# Patient Record
Sex: MALE | Race: WHITE | NOT HISPANIC OR LATINO | Employment: OTHER | URBAN - METROPOLITAN AREA
[De-identification: names, ages, dates, MRNs, and addresses within clinical notes are randomized per-mention and may not be internally consistent; named-entity substitution may affect disease eponyms.]

---

## 2017-04-24 ENCOUNTER — OFFICE VISIT - RIVER FALLS (OUTPATIENT)
Dept: FAMILY MEDICINE | Facility: CLINIC | Age: 52
End: 2017-04-24

## 2017-04-24 ASSESSMENT — MIFFLIN-ST. JEOR: SCORE: 1669.74

## 2017-04-28 ENCOUNTER — COMMUNICATION - RIVER FALLS (OUTPATIENT)
Dept: FAMILY MEDICINE | Facility: CLINIC | Age: 52
End: 2017-04-28

## 2017-04-28 ENCOUNTER — OFFICE VISIT - RIVER FALLS (OUTPATIENT)
Dept: FAMILY MEDICINE | Facility: CLINIC | Age: 52
End: 2017-04-28

## 2017-04-29 LAB
CHOLEST SERPL-MCNC: 216 MG/DL (ref 125–200)
CHOLEST/HDLC SERPL: 3.9 {RATIO}
GLUCOSE BLD-MCNC: 97 MG/DL (ref 65–99)
HDLC SERPL-MCNC: 56 MG/DL
LDLC SERPL CALC-MCNC: 145 MG/DL
NONHDLC SERPL-MCNC: 160 MG/DL
TRIGL SERPL-MCNC: 73 MG/DL

## 2017-09-14 ENCOUNTER — OFFICE VISIT - RIVER FALLS (OUTPATIENT)
Dept: FAMILY MEDICINE | Facility: CLINIC | Age: 52
End: 2017-09-14

## 2018-10-29 ENCOUNTER — OFFICE VISIT - RIVER FALLS (OUTPATIENT)
Dept: FAMILY MEDICINE | Facility: CLINIC | Age: 53
End: 2018-10-29

## 2018-10-29 ASSESSMENT — MIFFLIN-ST. JEOR: SCORE: 1672.24

## 2019-05-16 ENCOUNTER — OFFICE VISIT - RIVER FALLS (OUTPATIENT)
Dept: FAMILY MEDICINE | Facility: CLINIC | Age: 54
End: 2019-05-16

## 2019-05-16 ASSESSMENT — MIFFLIN-ST. JEOR: SCORE: 1663.16

## 2019-08-15 ENCOUNTER — AMBULATORY - RIVER FALLS (OUTPATIENT)
Dept: FAMILY MEDICINE | Facility: CLINIC | Age: 54
End: 2019-08-15

## 2019-08-16 ENCOUNTER — COMMUNICATION - RIVER FALLS (OUTPATIENT)
Dept: FAMILY MEDICINE | Facility: CLINIC | Age: 54
End: 2019-08-16

## 2019-08-16 LAB
A/G RATIO - HISTORICAL: 1.7 (ref 1–2.5)
ALBUMIN SERPL-MCNC: 4.4 GM/DL (ref 3.6–5.1)
ALP SERPL-CCNC: 92 UNIT/L (ref 40–115)
ALT SERPL W P-5'-P-CCNC: 19 UNIT/L (ref 9–46)
AST SERPL W P-5'-P-CCNC: 19 UNIT/L (ref 10–35)
BILIRUB SERPL-MCNC: 0.8 MG/DL (ref 0.2–1.2)
BUN SERPL-MCNC: 20 MG/DL (ref 7–25)
BUN/CREAT RATIO - HISTORICAL: 13 (ref 6–22)
CALCIUM SERPL-MCNC: 9.4 MG/DL (ref 8.6–10.3)
CHLORIDE BLD-SCNC: 105 MMOL/L (ref 98–110)
CHOLEST SERPL-MCNC: 216 MG/DL
CHOLEST/HDLC SERPL: 3.6 {RATIO}
CO2 SERPL-SCNC: 29 MMOL/L (ref 20–32)
CREAT SERPL-MCNC: 1.51 MG/DL (ref 0.7–1.33)
EGFRCR SERPLBLD CKD-EPI 2021: 52 ML/MIN/1.73M2
GLOBULIN: 2.6 (ref 1.9–3.7)
GLUCOSE BLD-MCNC: 97 MG/DL (ref 65–99)
HDLC SERPL-MCNC: 60 MG/DL
LDLC SERPL CALC-MCNC: 137 MG/DL
NONHDLC SERPL-MCNC: 156 MG/DL
POTASSIUM BLD-SCNC: 4.4 MMOL/L (ref 3.5–5.3)
PROT SERPL-MCNC: 7 GM/DL (ref 6.1–8.1)
SODIUM SERPL-SCNC: 141 MMOL/L (ref 135–146)
TRIGL SERPL-MCNC: 87 MG/DL

## 2019-09-16 ENCOUNTER — OFFICE VISIT - RIVER FALLS (OUTPATIENT)
Dept: FAMILY MEDICINE | Facility: CLINIC | Age: 54
End: 2019-09-16

## 2019-09-16 ASSESSMENT — MIFFLIN-ST. JEOR: SCORE: 1655.91

## 2019-09-20 ENCOUNTER — COMMUNICATION - RIVER FALLS (OUTPATIENT)
Dept: FAMILY MEDICINE | Facility: CLINIC | Age: 54
End: 2019-09-20

## 2019-12-12 ENCOUNTER — OFFICE VISIT - RIVER FALLS (OUTPATIENT)
Dept: FAMILY MEDICINE | Facility: CLINIC | Age: 54
End: 2019-12-12

## 2019-12-12 ASSESSMENT — MIFFLIN-ST. JEOR: SCORE: 1648.65

## 2019-12-19 ENCOUNTER — AMBULATORY - RIVER FALLS (OUTPATIENT)
Dept: FAMILY MEDICINE | Facility: CLINIC | Age: 54
End: 2019-12-19

## 2019-12-20 LAB
BUN SERPL-MCNC: 23 MG/DL (ref 7–25)
BUN/CREAT RATIO - HISTORICAL: 15 (ref 6–22)
CALCIUM SERPL-MCNC: 10 MG/DL (ref 8.6–10.3)
CHLORIDE BLD-SCNC: 104 MMOL/L (ref 98–110)
CHOLEST SERPL-MCNC: 220 MG/DL
CHOLEST/HDLC SERPL: 3.4 {RATIO}
CO2 SERPL-SCNC: 28 MMOL/L (ref 20–32)
CREAT SERPL-MCNC: 1.51 MG/DL (ref 0.7–1.33)
EGFRCR SERPLBLD CKD-EPI 2021: 52 ML/MIN/1.73M2
GLUCOSE BLD-MCNC: 101 MG/DL (ref 65–99)
HDLC SERPL-MCNC: 64 MG/DL
LDLC SERPL CALC-MCNC: 138 MG/DL
NONHDLC SERPL-MCNC: 156 MG/DL
POTASSIUM BLD-SCNC: 4.5 MMOL/L (ref 3.5–5.3)
SODIUM SERPL-SCNC: 141 MMOL/L (ref 135–146)
TRIGL SERPL-MCNC: 81 MG/DL

## 2019-12-22 ENCOUNTER — COMMUNICATION - RIVER FALLS (OUTPATIENT)
Dept: FAMILY MEDICINE | Facility: CLINIC | Age: 54
End: 2019-12-22

## 2019-12-26 ENCOUNTER — OFFICE VISIT - RIVER FALLS (OUTPATIENT)
Dept: FAMILY MEDICINE | Facility: CLINIC | Age: 54
End: 2019-12-26

## 2019-12-26 ASSESSMENT — MIFFLIN-ST. JEOR: SCORE: 1664.07

## 2020-01-14 ENCOUNTER — OFFICE VISIT - RIVER FALLS (OUTPATIENT)
Dept: FAMILY MEDICINE | Facility: CLINIC | Age: 55
End: 2020-01-14

## 2020-01-15 LAB
ALBUMIN SERPL-MCNC: 4.4 GM/DL (ref 3.6–5.1)
ALBUMIN UR-MCNC: NEGATIVE G/DL
APPEARANCE UR: CLEAR
BACTERIA #/AREA URNS HPF: NORMAL /HPF
BILIRUB UR QL STRIP: NEGATIVE
BUN SERPL-MCNC: 30 MG/DL (ref 7–25)
BUN/CREAT RATIO - HISTORICAL: 21 (ref 6–22)
CALCIUM SERPL-MCNC: 9.5 MG/DL (ref 8.6–10.3)
CAOX CRY #/AREA URNS HPF: NORMAL /HPF
CHLORIDE BLD-SCNC: 103 MMOL/L (ref 98–110)
CO2 SERPL-SCNC: 28 MMOL/L (ref 20–32)
COLOR UR AUTO: YELLOW
CREAT SERPL-MCNC: 1.44 MG/DL (ref 0.7–1.33)
CREAT UR-MCNC: 132 MG/DL (ref 20–320)
EGFRCR SERPLBLD CKD-EPI 2021: 55 ML/MIN/1.73M2
GLUCOSE BLD-MCNC: 84 MG/DL (ref 65–139)
GLUCOSE UR STRIP-MCNC: NEGATIVE MG/DL
HGB BLD-MCNC: 15.3 GM/DL (ref 13.2–17.1)
HGB UR QL STRIP: NEGATIVE
HYALINE CASTS #/AREA URNS LPF: NORMAL /LPF
KETONES UR STRIP-MCNC: NEGATIVE MG/DL
LEUKOCYTE ESTERASE UR QL STRIP: NEGATIVE
NITRATE UR QL: NEGATIVE
PH UR STRIP: 6 [PH] (ref 5–8)
POTASSIUM BLD-SCNC: 4.3 MMOL/L (ref 3.5–5.3)
PROT UR-MCNC: 15 MG/DL (ref 5–25)
PROT/CREAT 24H UR: 0.11 MG/G{CREAT} (ref 0.02–0.13)
PROT/CREAT 24H UR: 114 MG/G{CREAT} (ref 22–128)
PTH-INTACT SERPL-MCNC: 43 PG/ML (ref 14–64)
RBC #/AREA URNS AUTO: NORMAL /HPF
SODIUM SERPL-SCNC: 138 MMOL/L (ref 135–146)
SP GR UR STRIP: 1.02 (ref 1–1.03)
SQUAMOUS #/AREA URNS AUTO: NORMAL /HPF
WBC #/AREA URNS AUTO: NORMAL /HPF

## 2020-01-16 ENCOUNTER — COMMUNICATION - RIVER FALLS (OUTPATIENT)
Dept: FAMILY MEDICINE | Facility: CLINIC | Age: 55
End: 2020-01-16

## 2020-03-10 ENCOUNTER — OFFICE VISIT - RIVER FALLS (OUTPATIENT)
Dept: FAMILY MEDICINE | Facility: CLINIC | Age: 55
End: 2020-03-10

## 2020-03-10 ASSESSMENT — MIFFLIN-ST. JEOR: SCORE: 1677.68

## 2020-06-09 ENCOUNTER — OFFICE VISIT - RIVER FALLS (OUTPATIENT)
Dept: FAMILY MEDICINE | Facility: CLINIC | Age: 55
End: 2020-06-09

## 2020-06-09 ASSESSMENT — MIFFLIN-ST. JEOR: SCORE: 1681.31

## 2021-02-03 ENCOUNTER — OFFICE VISIT - RIVER FALLS (OUTPATIENT)
Dept: FAMILY MEDICINE | Facility: CLINIC | Age: 56
End: 2021-02-03

## 2021-02-03 ASSESSMENT — MIFFLIN-ST. JEOR: SCORE: 1691.29

## 2021-04-06 ENCOUNTER — AMBULATORY - RIVER FALLS (OUTPATIENT)
Dept: FAMILY MEDICINE | Facility: CLINIC | Age: 56
End: 2021-04-06

## 2021-04-07 ENCOUNTER — COMMUNICATION - RIVER FALLS (OUTPATIENT)
Dept: FAMILY MEDICINE | Facility: CLINIC | Age: 56
End: 2021-04-07

## 2021-04-07 LAB
ALBUMIN UR-MCNC: NEGATIVE G/DL
APPEARANCE UR: CLEAR
BACTERIA #/AREA URNS HPF: NORMAL /HPF
BILIRUB UR QL STRIP: NEGATIVE
BUN SERPL-MCNC: 17 MG/DL (ref 7–25)
BUN/CREAT RATIO - HISTORICAL: 12 (ref 6–22)
CALCIUM SERPL-MCNC: 9.8 MG/DL (ref 8.6–10.3)
CHLORIDE BLD-SCNC: 104 MMOL/L (ref 98–110)
CO2 SERPL-SCNC: 26 MMOL/L (ref 20–32)
COLOR UR AUTO: YELLOW
CREAT SERPL-MCNC: 1.39 MG/DL (ref 0.7–1.33)
CREAT UR-MCNC: 17 MG/DL (ref 20–320)
EGFRCR SERPLBLD CKD-EPI 2021: 57 ML/MIN/1.73M2
GLUCOSE BLD-MCNC: 79 MG/DL (ref 65–99)
GLUCOSE UR STRIP-MCNC: NEGATIVE MG/DL
HGB BLD-MCNC: 15.8 GM/DL (ref 13.2–17.1)
HGB UR QL STRIP: NEGATIVE
HYALINE CASTS #/AREA URNS LPF: NORMAL /LPF
KETONES UR STRIP-MCNC: NEGATIVE MG/DL
LEUKOCYTE ESTERASE UR QL STRIP: NEGATIVE
MICROALBUMIN UR-MCNC: <0.2 MG/DL
MICROALBUMIN/CREAT UR: ABNORMAL MG/G{CREAT}
NITRATE UR QL: NEGATIVE
PH UR STRIP: 6.5 [PH] (ref 5–8)
PHOSPHATE SERPL-MCNC: 2.9 MG/DL (ref 2.5–4.5)
POTASSIUM BLD-SCNC: 3.8 MMOL/L (ref 3.5–5.3)
PTH-INTACT SERPL-MCNC: 43 PG/ML (ref 14–64)
RBC #/AREA URNS AUTO: NORMAL /HPF
SODIUM SERPL-SCNC: 138 MMOL/L (ref 135–146)
SP GR UR STRIP: 1 (ref 1–1.03)
SQUAMOUS #/AREA URNS AUTO: NORMAL /HPF
WBC #/AREA URNS AUTO: NORMAL /HPF

## 2021-04-14 ENCOUNTER — OFFICE VISIT - RIVER FALLS (OUTPATIENT)
Dept: FAMILY MEDICINE | Facility: CLINIC | Age: 56
End: 2021-04-14

## 2021-04-14 ASSESSMENT — MIFFLIN-ST. JEOR: SCORE: 1700.36

## 2021-12-30 ENCOUNTER — AMBULATORY - RIVER FALLS (OUTPATIENT)
Dept: FAMILY MEDICINE | Facility: CLINIC | Age: 56
End: 2021-12-30

## 2021-12-31 ENCOUNTER — COMMUNICATION - RIVER FALLS (OUTPATIENT)
Dept: FAMILY MEDICINE | Facility: CLINIC | Age: 56
End: 2021-12-31

## 2021-12-31 LAB
BUN SERPL-MCNC: 20 MG/DL (ref 7–25)
BUN/CREAT RATIO - HISTORICAL: 15 (ref 6–22)
CALCIUM SERPL-MCNC: 9.5 MG/DL (ref 8.6–10.3)
CHLORIDE BLD-SCNC: 104 MMOL/L (ref 98–110)
CHOLEST SERPL-MCNC: 220 MG/DL
CHOLEST/HDLC SERPL: 3.9 {RATIO}
CO2 SERPL-SCNC: 29 MMOL/L (ref 20–32)
CREAT SERPL-MCNC: 1.34 MG/DL (ref 0.7–1.33)
EGFRCR SERPLBLD CKD-EPI 2021: 59 ML/MIN/1.73M2
GLUCOSE BLD-MCNC: 91 MG/DL (ref 65–99)
HDLC SERPL-MCNC: 56 MG/DL
LDLC SERPL CALC-MCNC: 149 MG/DL
NONHDLC SERPL-MCNC: 164 MG/DL
POTASSIUM BLD-SCNC: 3.9 MMOL/L (ref 3.5–5.3)
SODIUM SERPL-SCNC: 139 MMOL/L (ref 135–146)
TRIGL SERPL-MCNC: 60 MG/DL

## 2022-01-20 ENCOUNTER — MEDICAL CORRESPONDENCE (OUTPATIENT)
Dept: HEALTH INFORMATION MANAGEMENT | Facility: CLINIC | Age: 57
End: 2022-01-20

## 2022-01-20 ENCOUNTER — OFFICE VISIT - RIVER FALLS (OUTPATIENT)
Dept: FAMILY MEDICINE | Facility: CLINIC | Age: 57
End: 2022-01-20

## 2022-01-21 ENCOUNTER — COMMUNICATION - RIVER FALLS (OUTPATIENT)
Dept: FAMILY MEDICINE | Facility: CLINIC | Age: 57
End: 2022-01-21

## 2022-01-21 LAB — PSA SERPL-MCNC: 1.3 NG/ML

## 2022-02-12 VITALS
HEART RATE: 75 BPM | WEIGHT: 182.6 LBS | HEART RATE: 67 BPM | BODY MASS INDEX: 27.47 KG/M2 | SYSTOLIC BLOOD PRESSURE: 108 MMHG | BODY MASS INDEX: 27.55 KG/M2 | TEMPERATURE: 97.9 F | DIASTOLIC BLOOD PRESSURE: 70 MMHG | HEIGHT: 69 IN | BODY MASS INDEX: 27.05 KG/M2 | DIASTOLIC BLOOD PRESSURE: 68 MMHG | SYSTOLIC BLOOD PRESSURE: 110 MMHG | WEIGHT: 186 LBS | SYSTOLIC BLOOD PRESSURE: 100 MMHG | OXYGEN SATURATION: 97 % | HEIGHT: 69 IN | HEIGHT: 69 IN | HEART RATE: 72 BPM | DIASTOLIC BLOOD PRESSURE: 64 MMHG | OXYGEN SATURATION: 99 %

## 2022-02-12 VITALS
WEIGHT: 192 LBS | HEIGHT: 69 IN | HEART RATE: 85 BPM | SYSTOLIC BLOOD PRESSURE: 104 MMHG | OXYGEN SATURATION: 96 % | BODY MASS INDEX: 28.44 KG/M2 | TEMPERATURE: 97.9 F | DIASTOLIC BLOOD PRESSURE: 70 MMHG

## 2022-02-12 VITALS
HEART RATE: 77 BPM | BODY MASS INDEX: 27.52 KG/M2 | HEIGHT: 69 IN | TEMPERATURE: 98 F | SYSTOLIC BLOOD PRESSURE: 102 MMHG | OXYGEN SATURATION: 94 % | WEIGHT: 185.8 LBS | DIASTOLIC BLOOD PRESSURE: 68 MMHG

## 2022-02-12 VITALS
SYSTOLIC BLOOD PRESSURE: 102 MMHG | BODY MASS INDEX: 27.28 KG/M2 | TEMPERATURE: 98.3 F | HEART RATE: 72 BPM | WEIGHT: 184.2 LBS | HEIGHT: 69 IN | DIASTOLIC BLOOD PRESSURE: 80 MMHG

## 2022-02-12 VITALS
SYSTOLIC BLOOD PRESSURE: 120 MMHG | WEIGHT: 194 LBS | HEART RATE: 77 BPM | OXYGEN SATURATION: 99 % | BODY MASS INDEX: 28.73 KG/M2 | HEIGHT: 69 IN | TEMPERATURE: 96.4 F | DIASTOLIC BLOOD PRESSURE: 83 MMHG

## 2022-02-12 VITALS
OXYGEN SATURATION: 96 % | BODY MASS INDEX: 27.67 KG/M2 | DIASTOLIC BLOOD PRESSURE: 66 MMHG | WEIGHT: 187.4 LBS | SYSTOLIC BLOOD PRESSURE: 108 MMHG | HEART RATE: 75 BPM

## 2022-02-12 VITALS
BODY MASS INDEX: 27.99 KG/M2 | WEIGHT: 189 LBS | HEIGHT: 69 IN | DIASTOLIC BLOOD PRESSURE: 70 MMHG | TEMPERATURE: 97 F | SYSTOLIC BLOOD PRESSURE: 116 MMHG | HEART RATE: 64 BPM

## 2022-02-12 VITALS
HEART RATE: 70 BPM | SYSTOLIC BLOOD PRESSURE: 100 MMHG | BODY MASS INDEX: 27.74 KG/M2 | DIASTOLIC BLOOD PRESSURE: 68 MMHG | WEIGHT: 187.25 LBS | TEMPERATURE: 97.4 F | HEIGHT: 69 IN

## 2022-02-12 VITALS
HEIGHT: 69 IN | WEIGHT: 187.8 LBS | BODY MASS INDEX: 27.81 KG/M2 | DIASTOLIC BLOOD PRESSURE: 60 MMHG | TEMPERATURE: 97.9 F | SYSTOLIC BLOOD PRESSURE: 100 MMHG | HEART RATE: 66 BPM

## 2022-02-12 VITALS
TEMPERATURE: 98 F | BODY MASS INDEX: 28.11 KG/M2 | HEIGHT: 69 IN | HEART RATE: 73 BPM | WEIGHT: 189.8 LBS | OXYGEN SATURATION: 96 % | SYSTOLIC BLOOD PRESSURE: 112 MMHG | DIASTOLIC BLOOD PRESSURE: 76 MMHG

## 2022-02-15 NOTE — PROGRESS NOTES
Patient:   SALUD TRUJILLO            MRN: [id number]            FIN: [id number]               Age:   51 years     Sex:  Male     :  1965   Associated Diagnoses:   Moderate major depression   Author:   Juli Galvan MD      Chief Complaint   2017 4:23 PM CDT    Med check-Depression. Tolerating med well.        Interval History   Patient here for follow up on depression. Medication is working well. No side effects noted. Would like to continue medication.       Health Status   Allergies:    Allergic Reactions (All)  Severity Not Documented  Aspirin Adult Low Strength (Stomach upset)  Celexa (Agitated)  Melatonin (Anxiety,nausea,hot flashes)   Medications:  (Selected)   Prescriptions  Prescribed  Paxil 30 mg oral tablet: 1 tab(s) ( 30 mg ), PO, Daily, # 90 tab(s), 3 Refill(s), Type: Maintenance, Pharmacy: [facility] #[id number], 1 tab(s) po daily  Viagra 100 mg oral tablet: 1 tab(s) ( 100 mg ), PO, daily, # 9 tab(s), 11 Refill(s), Type: Soft Stop, Pharmacy: [facility] #[id number], 1 tab(s) po daily      Histories   Past Medical History:    Resolved  Moderate recurrent major depression (ICD-9-.32):  Resolved.  Vestibular schwannoma (SNOMED CT 397053962):  Resolved.   Family History:    CA - Cancer  Father ()     Procedure history:    Colonoscopy (439000756) on 2016 at 50 Years.  Comments:  2016 8:18 AM - Delon Patel MD  Indication: Screening  Sedation: Versed 3 mg, Fentanyl 150 mcg  Findings: Mild Sigmoid diverticulosis  Recommendation: Repeat in 10 years  Esophagogastroduodenoscopy (1041135493) on 2011 at 45 Years.  Removal of vestibular schwannoma (194265654) in  at 43 Years.  Tonsillectomy and adenoidectomy ([id number]).   Social History:        Alcohol Assessment            Beer (12 oz), 3 x's per week., 2 drinks/episode average.  3 drinks/episode maximum.      Tobacco Assessment: Denies Tobacco Use            Never smoker      Substance Abuse Assessment             Never      Employment and Education Assessment            Employed, Work/School description: .      Home and Environment Assessment            Marital status:  (Living together).  Spouse/Partner name: Kortney.  Lives with Spouse.  Living situation:               Home/Independent.      Nutrition and Health Assessment            Type of diet: Regular.      Exercise and Physical Activity Assessment: Does not exercise        Physical Examination   Vital Signs   4/24/2017 4:23 PM CDT Temperature Tympanic 97.4 DegF  LOW    Peripheral Pulse Rate 70 bpm    Pulse Site Radial artery    HR Method Manual    Systolic Blood Pressure 100 mmHg    Diastolic Blood Pressure 68 mmHg    Mean Arterial Pressure 79 mmHg    BP Site Right arm    BP Method Manual      Measurements from flowsheet : Measurements   4/24/2017 4:23 PM CDT Height Measured - Standard 69 in    Weight Measured - Standard 187.25 lb    BSA 2.03 m2    Body Mass Index 27.65 kg/m2      General:  Alert and oriented, No acute distress.    Psychiatric:  Cooperative, Appropriate mood & affect, Normal judgment, phq9 reviewed.       Impression and Plan   Diagnosis     Moderate major depression (WHL53-QA F32.1).     Course:  Well controlled.    Orders     Orders (Selected)   Prescriptions  Prescribed  Paxil 30 mg oral tablet: 1 tab(s) ( 30 mg ), PO, Daily, # 90 tab(s), 3 Refill(s), Type: Maintenance, Pharmacy: [facility] #[id number], 1 tab(s) po daily.

## 2022-02-15 NOTE — LETTER
(Inserted Image. Unable to display)         March 11, 2021        SALUD TRUJILLO   [address] [place] [id number]        Dear SALUD,    Thank you for selecting [facility] for your healthcare needs.    Our records indicate you are due for the following services:     Kidney Disease Follow Up   Non-Fasting Labs  Urine labs ~ Please be prepared to leave a urine specimen for evaluation     If you had your labs done at another facility or with Direct Access Lab Testing at [facility]   [place], please bring in a copy of the results to your next visit, mail a copy, or drop off a copy of your results to your Healthcare Provider.     (FYI   Regarding office visits: In some instances, a video visit or telephone visit may be offered as an option.)    To schedule an appointment or if you have further questions, please contact your clinic at [phone].    Powered by [employer]    Sincerely,    Malcolm Herrera MD

## 2022-02-15 NOTE — NURSING NOTE
Comprehensive Intake Entered On:  6/9/2020 1:41 PM CDT    Performed On:  6/9/2020 1:38 PM CDT by Evonne CHAVEZ, Brittanie               Summary   Chief Complaint :   f/u skin exam, has spot on back.   Menstrual Status :   N/A   Weight Measured :   189.8 lb(Converted to: 189 lb 13 oz, 86.09 kg)    Height Measured :   69 in(Converted to: 5 ft 9 in, 175.26 cm)    Body Mass Index :   28.03 kg/m2 (HI)    Body Surface Area :   2.05 m2   Systolic Blood Pressure :   112 mmHg   Diastolic Blood Pressure :   76 mmHg   Mean Arterial Pressure :   88 mmHg   Peripheral Pulse Rate :   73 bpm   BP Site :   Right arm   Pulse Site :   Radial artery   BP Method :   Manual   HR Method :   Manual   Temperature Tympanic :   98 DegF(Converted to: 36.7 DegC)    Oxygen Saturation :   96 %   Brittanie Douglass MA - 6/9/2020 1:38 PM CDT   Health Status   Allergies Verified? :   Yes   Medication History Verified? :   Yes   Immunizations Current :   Unknown   Medical History Verified? :   Yes   Pre-Visit Planning Status :   Completed   Tobacco Use? :   Former smoker   Brittanie Douglass MA - 6/9/2020 1:38 PM CDT   Consents   Consent for Immunization Exchange :   Consent Granted   Consent for Immunizations to Providers :   Consent Granted   Brittanie Douglass MA - 6/9/2020 1:38 PM CDT   Meds / Allergies   (As Of: 6/9/2020 1:41:51 PM CDT)   Allergies (Active)   Aspirin Adult Low Strength  Estimated Onset Date:   Unspecified ; Reactions:   Stomach upset ; Created By:   Claudia Barrera LPN; Reaction Status:   Active ; Category:   Drug ; Substance:   Aspirin Adult Low Strength ; Type:   Allergy ; Updated By:   Claudia Barrera LPN; Reviewed Date:   1/14/2020 2:19 PM CST      Celexa  Estimated Onset Date:   Unspecified ; Reactions:   Agitated ; Created By:   America Montelongo; Reaction Status:   Active ; Category:   Drug ; Substance:   Celexa ; Type:   Allergy ; Updated By:   America Montelongo; Source:   Paper Chart ; Reviewed Date:   1/14/2020 2:19 PM CST       melatonin  Estimated Onset Date:   Unspecified ; Reactions:   Anxiety,nausea,hot flashes ; Created By:   Claudia Barrera LPN; Reaction Status:   Active ; Category:   Drug ; Substance:   melatonin ; Type:   Allergy ; Updated By:   Claudia Barrera LPN; Reviewed Date:   1/14/2020 2:19 PM CST        Medication List   (As Of: 6/9/2020 1:41:51 PM CDT)   Prescription/Discharge Order    PARoxetine  :   PARoxetine ; Status:   Prescribed ; Ordered As Mnemonic:   Paxil 30 mg oral tablet ; Simple Display Line:   30 mg, 1 tab(s), PO, Daily, 90 tab(s), 3 Refill(s) ; Ordering Provider:   Dat Huffman PA-C; Catalog Code:   PARoxetine ; Order Dt/Tm:   12/26/2019 8:09:33 AM CST          sildenafil  :   sildenafil ; Status:   Prescribed ; Ordered As Mnemonic:   Viagra 100 mg oral tablet ; Simple Display Line:   100 mg, 1 tab(s), PO, daily, 9 tab(s), 11 Refill(s) ; Ordering Provider:   Juli Galvan MD; Catalog Code:   sildenafil ; Order Dt/Tm:   4/7/2016 9:12:20 AM CDT            ID Risk Screen   Recent Travel History :   No recent travel   Family Member Travel History :   No recent travel   Other Exposure to Infectious Disease :   Unknown   Evonne CHAVEZ, Brittanie - 6/9/2020 1:38 PM CDT

## 2022-02-15 NOTE — NURSING NOTE
Comprehensive Intake Entered On:  12/12/2019 5:40 PM CST    Performed On:  12/12/2019 5:36 PM CST by Evonne CHAVEZ, Brittanie               Summary   Chief Complaint :   f/u skin exam--BCC to left ear and mid back.  noticed new lesions on right nostril, left cheek and forehead.   Menstrual Status :   N/A   Weight Measured :   182.6 lb(Converted to: 182 lb 10 oz, 82.83 kg)    Height Measured :   69 in(Converted to: 5 ft 9 in, 175.26 cm)    Body Mass Index :   26.96 kg/m2 (HI)    Body Surface Area :   2.01 m2   Systolic Blood Pressure :   100 mmHg   Diastolic Blood Pressure :   68 mmHg   Mean Arterial Pressure :   79 mmHg   Peripheral Pulse Rate :   72 bpm   BP Site :   Right arm   Pulse Site :   Radial artery   BP Method :   Manual   HR Method :   Manual   Temperature Tympanic :   97.9 DegF(Converted to: 36.6 DegC)    Brittanie Douglass MA - 12/12/2019 5:36 PM CST   Health Status   Allergies Verified? :   Yes   Medication History Verified? :   Yes   Immunizations Current :   Unknown   Medical History Verified? :   Yes   Pre-Visit Planning Status :   Completed   Tobacco Use? :   Never smoker   Brittanie Douglass MA - 12/12/2019 5:36 PM CST   Consents   Consent for Immunization Exchange :   Consent Granted   Consent for Immunizations to Providers :   Consent Granted   Brittanie Douglass MA - 12/12/2019 5:36 PM CST   Meds / Allergies   (As Of: 12/12/2019 5:40:15 PM CST)   Allergies (Active)   Aspirin Adult Low Strength  Estimated Onset Date:   Unspecified ; Reactions:   Stomach upset ; Created By:   Claudia Barrera LPN; Reaction Status:   Active ; Category:   Drug ; Substance:   Aspirin Adult Low Strength ; Type:   Allergy ; Updated By:   Claudia Barrera LPN; Reviewed Date:   5/16/2019 9:24 AM CDT      Celexa  Estimated Onset Date:   Unspecified ; Reactions:   Agitated ; Created By:   America Montelongo; Reaction Status:   Active ; Category:   Drug ; Substance:   Celexa ; Type:   Allergy ; Updated By:   America Montelongo; Source:   Paper  Chart ; Reviewed Date:   5/16/2019 9:24 AM CDT      melatonin  Estimated Onset Date:   Unspecified ; Reactions:   Anxiety,nausea,hot flashes ; Created By:   Claudia Barrera LPN; Reaction Status:   Active ; Category:   Drug ; Substance:   melatonin ; Type:   Allergy ; Updated By:   Claudia Barrera LPN; Reviewed Date:   5/16/2019 9:24 AM CDT        Medication List   (As Of: 12/12/2019 5:40:15 PM CST)   Prescription/Discharge Order    PARoxetine  :   PARoxetine ; Status:   Prescribed ; Ordered As Mnemonic:   Paxil 30 mg oral tablet ; Simple Display Line:   30 mg, 1 tab(s), PO, Daily, 90 tab(s), 0 Refill(s) ; Ordering Provider:   Juli Galvan MD; Catalog Code:   PARoxetine ; Order Dt/Tm:   8/13/2019 10:47:52 AM CDT          sildenafil  :   sildenafil ; Status:   Prescribed ; Ordered As Mnemonic:   Viagra 100 mg oral tablet ; Simple Display Line:   100 mg, 1 tab(s), PO, daily, 9 tab(s), 11 Refill(s) ; Ordering Provider:   Juli Galvan MD; Catalog Code:   sildenafil ; Order Dt/Tm:   4/7/2016 9:12:20 AM CDT            Social History   Social History   (As Of: 12/12/2019 5:40:15 PM CST)   Alcohol:        Beer (12 oz), 3 x's per week., 2 drinks/episode average.  3 drinks/episode maximum.   (Last Updated: 4/7/2016 11:39:19 AM CDT by Claudia Barrera LPN)          Tobacco:  Denies Tobacco Use      Never smoker   (Last Updated: 4/24/2017 4:27:05 PM CDT by Claudia Barrera LPN)          Substance Abuse:        Never   (Last Updated: 4/7/2016 11:40:50 AM CDT by Claudia Barrera LPN)          Employment/School:        Employed, Work/School description: .   (Last Updated: 4/7/2016 11:39:50 AM CDT by Claudia Barrera LPN)          Home/Environment:        Marital status:  (Living together).  Spouse/Partner name: Kortney.  Lives with Spouse.  Living situation: Home/Independent.   (Last Updated: 4/7/2016 11:40:07 AM CDT by Claudia Barrera LPN)          Nutrition/Health:        Type of diet: Regular.   (Last Updated:  4/7/2016 11:40:23 AM CDT by Claudia Barrera LPN)          Exercise:  Does not exercise      (Last Updated: 4/7/2016 11:40:34 AM CDT by Claudia Barrera LPN )

## 2022-02-15 NOTE — LETTER
(Inserted Image. Unable to display)   September 07, 2021  SALUD TRUJILLO   [address] [place] [id number]          Dear SALUD,      Thank you for selecting [facility] for your healthcare needs.    Our records indicate you are due for the following services:    Annual Physical & Fasting Lab Tests ~ Please do not eat or drink anything 10 hours prior to your scheduled appointment time.  (Water and any medications that you may need are allowed unless directed otherwise.)    If you had your labs done at another facility or with Direct Access Lab Testing at [facility], please bring in a copy of the results to your next visit, mail a copy, or drop off a copy of your results to your Healthcare Provider.    You are due for lab work and an office visit; please schedule the lab appointment 1 week before the office visit.  This will assure all results are available to discuss with your Healthcare Provider during your visit.    (FYI   Regarding office visits: In some instances, a video visit or telephone visit may be offered as an option.)      **It is very helpful if you bring your medication bottles to your appointment.  This assures we have all of your current medications, including strength and dosing information, documented accurately in your medical record.    To schedule an appointment or if you have further questions, please contact your clinic at [phone].         Powered by [employer]    Sincerely,    Juli Galvan M.D.

## 2022-02-15 NOTE — LETTER
(Inserted Image. Unable to display)   [address] [place] [address]  April 07, 2021      SALUD TRUJILLO   [address] [place] [address]        Dear SALUD,     Thank you for selecting [facility] for your healthcare needs. Below you will find the results of your recent test(s) done at our clinic.       Labs for your review.  We can discuss in more detail at future clinic visit.       Result Name Current Result Previous Result Reference Range   Sodium Level (mmol/L)  138 4/6/2021  138 1/14/2020 135 - 146   Potassium Level (mmol/L)  3.8 4/6/2021  4.3 1/14/2020 3.5 - 5.3   Chloride Level (mmol/L)  104 4/6/2021  103 1/14/2020 98 - 110   CO2 Level (mmol/L)  26 4/6/2021  28 1/14/2020 20 - 32   Glucose Level (mg/dL)  79 4/6/2021  84 1/14/2020 65 - 99   BUN (mg/dL)  17 4/6/2021 ((H)) 30 1/14/2020 7 - 25   Creatinine Level (mg/dL) ((H)) 1.39 4/6/2021 ((H)) 1.44 1/14/2020 0.70 - 1.33   BUN/Creat Ratio  12 4/6/2021  21 1/14/2020 6 - 22   eGFR (mL/min/1.73m2) ((L)) 57 4/6/2021 ((L)) 55 1/14/2020 > OR = 60 -    eGFR  (mL/min/1.73m2)  66 4/6/2021  63 1/14/2020 > OR = 60 -    Calcium Level (mg/dL)  9.8 4/6/2021  9.5 1/14/2020 8.6 - 10.3   Phosphorus Level (mg/dL)  2.9 4/6/2021  2.5 - 4.5   PTH Intact (pg/mL)  43 4/6/2021  43 1/14/2020 14 - 64   U Creatinine (mg/dL) ((L)) 17 4/6/2021  20 - 320   U Microalbumin (mg/dL)  <0.2 4/6/2021  See Note: -    Ur Microalbumin/Creatinine Ratio  NOTE 4/6/2021   - <30   Hgb (gm/dL)  15.8 4/6/2021  15.3 1/14/2020 13.2 - 17.1   UA Color  YELLOW 4/6/2021  YELLOW 1/14/2020 YELLOW -    UA Appear  CLEAR 4/6/2021  CLEAR 1/14/2020 CLEAR -    UA pH  6.5 4/6/2021  6.0 1/14/2020 5.0 - 8.0   UA Specific Robbins  1.003 4/6/2021  1.022 1/14/2020 1.001 - 1.035   UA Glucose  NEGATIVE 4/6/2021  NEGATIVE 1/14/2020 NEGATIVE - NEGATIVE   UA Bilirubin  NEGATIVE 4/6/2021  NEGATIVE 1/14/2020 NEGATIVE - NEGATIVE   UA Ketones   NEGATIVE 4/6/2021  NEGATIVE 1/14/2020 NEGATIVE - NEGATIVE   UA Blood  NEGATIVE 4/6/2021  NEGATIVE 1/14/2020 NEGATIVE - NEGATIVE   UA Protein  NEGATIVE 4/6/2021  NEGATIVE 1/14/2020 NEGATIVE - NEGATIVE   UA Nitrite  NEGATIVE 4/6/2021  NEGATIVE 1/14/2020 NEGATIVE - NEGATIVE   UA Leukocyte Esterase  NEGATIVE 4/6/2021  NEGATIVE 1/14/2020 NEGATIVE - NEGATIVE   UA Squamous Epithelial Cells (/HPF)  NONE SEEN 4/6/2021  NONE SEEN 1/14/2020  - < OR = 5   UA Hyaline Cast (/LPF)  NONE SEEN 4/6/2021  NONE SEEN 1/14/2020 NONE SEEN -    UA WBC (/HPF)  NONE SEEN 4/6/2021  NONE SEEN 1/14/2020  - < OR = 5   UA RBC (/HPF)  NONE SEEN 4/6/2021  NONE SEEN 1/14/2020  - < OR = 2   UA Bacteria (/HPF)  NONE SEEN 4/6/2021  NONE SEEN 1/14/2020 NONE SEEN -        Please contact me or my assistant at [phone] if you have any questions or concerns.     Sincerely,        Malcolm Herrera MD    What do your labs mean?  Below is a glossary of commonly ordered labs:  LDL - Bad Cholesterol  HDL - Good Cholesterol  AST/ALT - Liver Function  Cr/Creatinine - Kidney Function  Microalbumin - Kidney Function  BUN - Kidney Function  PSA - Prostate   TSH - Thyroid Hormone  HgbA1c - Diabetes Test  Hgb (Hemoglobin) - Red Blood Cells

## 2022-02-15 NOTE — LETTER
(Inserted Image. Unable to display)   [address] [place] [address]  August 16, 2019      SALUD TRUJILLO       [address], [place] [id number]      Dear SALUD,    Thank you for selecting [facility] for your healthcare needs. Below you will find the results of the recent tests done at our clinic.     Your lab results are listed below. Your kidney function (creatinine) is above normal. Cholesterol levels are mildly elevated. Please follow up in clinic to discuss further.     Result Name Current Result Reference Range   Sodium Level (mmol/L)  141 8/15/2019 135 - 146   Potassium Level (mmol/L)  4.4 8/15/2019 3.5 - 5.3   Chloride Level (mmol/L)  105 8/15/2019 98 - 110   CO2 Level (mmol/L)  29 8/15/2019 20 - 32   Glucose Level (mg/dL)  97 8/15/2019 65 - 99   BUN (mg/dL)  20 8/15/2019 7 - 25   Creatinine Level (mg/dL) ((H)) 1.51 8/15/2019 0.70 - 1.33   BUN/Creat Ratio  13 8/15/2019 6 - 22   Calcium Level (mg/dL)  9.4 8/15/2019 8.6 - 10.3   Bilirubin Total (mg/dL)  0.8 8/15/2019 0.2 - 1.2   Alkaline Phosphatase (unit/L)  92 8/15/2019 40 - 115   AST/SGOT (unit/L)  19 8/15/2019 10 - 35   ALT/SGPT (unit/L)  19 8/15/2019 9 - 46   Protein Total (gm/dL)  7.0 8/15/2019 6.1 - 8.1   Albumin Level (gm/dL)  4.4 8/15/2019 3.6 - 5.1   Globulin  2.6 8/15/2019 1.9 - 3.7   A/G Ratio  1.7 8/15/2019 1.0 - 2.5   Cholesterol (mg/dL) ((H)) 216 8/15/2019  - <200   Non-HDL Cholesterol ((H)) 156 8/15/2019  - <130   HDL (mg/dL)  60 8/15/2019 >40 -    Cholesterol/HDL Ratio  3.6 8/15/2019  - <5.0   LDL ((H)) 137 8/15/2019    Triglyceride (mg/dL)  87 8/15/2019  - <150       Please contact me or my assistant at [phone] if you have any questions or concerns.     Sincerely,        Juli Galvan M.D.

## 2022-02-15 NOTE — LETTER
(Inserted Image. Unable to display)   June 08, 2020      SALUD TRUJILLO   [address] [place] [phone]        Dear SALUD,      Thank you for selecting [facility] for your healthcare needs.      Recent CT of chest demonstrating stable lung nodule without any significant changes compared to last year's imaging.  No further imaging follow-up recommended for this nodule, but if tobacco use within the past 15 years, it would worth talking about potential benefits of annual CT imaging for lung cancer screening.            Please contact me or my assistant at [phone] if you have any questions or concerns.     Sincerely,        Malcolm Herrera MD

## 2022-02-15 NOTE — PROGRESS NOTES
Chief Complaint    here for skin check.  would like left ear looked at--has recurring scabbed lesion, has tried cryocautery.  History of Present Illness      Patient is here for check on skin lesion of the left pinna.  He has had this treated with cryocautery in the past.  It has recurred and continues to be irritated and crusty.  He has a long history of sun exposure as a young man.  He tries risk on sunscreen and wear a hat.  No family history of skin cancer.  Review of Systems      See HPI.  All other review of systems negative.  Physical Exam   Vitals & Measurements    T: 98.3   F (Tympanic)  HR: 72(Peripheral)  BP: 102/80     HT: 69 in  WT: 184.2 lb  BMI: 27.2       Alert, oriented, no acute distress       Normal heart rate       Nonlabored breathing        3 mm elevated crusty lesion on the left pinna        4 mm erythematous lesion with reflex structures on the mid back.  Assessment/Plan       1. Neoplasm of skin of ear (D48.5)         Suspect basal cell carcinoma versus pigmented actinic keratoses        Informed consent was obtained and each lesion was cleansed with alcohol anesthetized 1% lidocaine with epinephrine.  Shave biopsy was done of the ear lesion and a shave excision was done using a saucerization technique for the lesion on the back.  Patient Information     Name:SAULD TRUJILLO      Address:      [address] [address]     Sex:Male     YOB: 1965     [phone]     Emergency Contact:MARILEE TRUJILLO     MRN:[id number]     FIN:[id number]     Location:[facility]     Date of Service:09/16/2019      Primary Care Physician:       Juli Galvan MD, [phone]      Attending Physician:       Smith Stovall MD, [phone]  Problem List/Past Medical History    Ongoing     Major depression in full remission    Historical     Moderate recurrent major depression     Vestibular schwannoma  Procedure/Surgical History     Lithotripsy  using laser (05/17/2019)     Colonoscopy (06/01/2016)      Comments: Indication: Screening      Sedation: Versed 3 mg, Fentanyl 150 mcg      Findings: Mild Sigmoid diverticulosis      Recommendation: Repeat in 10 years.     Esophagogastroduodenoscopy (01/28/2011)     Removal of vestibular schwannoma (2008)     Tonsillectomy and adenoidectomy  Medications    Paxil 30 mg oral tablet, 30 mg= 1 tab(s), Oral, daily    Viagra 100 mg oral tablet, 100 mg= 1 tab(s), Oral, daily, 11 refills  Allergies    Aspirin Adult Low Strength (Stomach upset)    Celexa (Agitated)    melatonin (Anxiety,nausea,hot flashes)  Social History    Smoking Status - 09/16/2019     Never smoker     Alcohol      Beer (12 oz), 3 x's per week., 2 drinks/episode average. 3 drinks/episode maximum., 04/07/2016     Employment/School      Employed, Work/School description: ., 04/07/2016     Exercise - Does not exercise, 04/07/2016     Home/Environment      Marital status:  (Living together). Spouse/Partner name: Kortney. Lives with Spouse. Living situation: Home/Independent., 04/07/2016     Nutrition/Health      Type of diet: Regular., 04/07/2016     Substance Abuse      Never, 04/07/2016     Tobacco - Denies Tobacco Use, 04/19/2010      Never smoker, 04/24/2017  Family History    CA - Cancer: Father.    CA - Lung cancer: Father.    Pancreatic cancer: Mother.  Immunizations      Vaccine Date Status      tetanus/diphth/pertuss (Tdap) adult/adol 03/10/2015 Given      Td 06/09/2005 Recorded  Lab Results          Lab Results (Last 4 results within 90 days)           Sodium Level: 141 mmol/L [135 mmol/L - 146 mmol/L] (08/15/19 08:42:00)          Potassium Level: 4.4 mmol/L [3.5 mmol/L - 5.3 mmol/L] (08/15/19 08:42:00)          Chloride Level: 105 mmol/L [98 mmol/L - 110 mmol/L] (08/15/19 08:42:00)          CO2 Level: 29 mmol/L [20 mmol/L - 32 mmol/L] (08/15/19 08:42:00)          Glucose Level: 97 mg/dL [65 mg/dL - 99 mg/dL] (08/15/19 08:42:00)           BUN: 20 mg/dL [7 mg/dL - 25 mg/dL] (08/15/19 08:42:00)          Creatinine Level: 1.51 mg/dL High [0.7 mg/dL - 1.33 mg/dL] (08/15/19 08:42:00)          BUN/Creat Ratio: 13 [6  - 22] (08/15/19 08:42:00)          eGFR: 52 mL/min/1.73m2 Low (08/15/19 08:42:00)          eGFR African American: 60 mL/min/1.73m2 (08/15/19 08:42:00)          Calcium Level: 9.4 mg/dL [8.6 mg/dL - 10.3 mg/dL] (08/15/19 08:42:00)          Bilirubin Total: 0.8 mg/dL [0.2 mg/dL - 1.2 mg/dL] (08/15/19 08:42:00)          Alkaline Phosphatase: 92 unit/L [40 unit/L - 115 unit/L] (08/15/19 08:42:00)          AST/SGOT: 19 unit/L [10 unit/L - 35 unit/L] (08/15/19 08:42:00)          ALT/SGPT: 19 unit/L [9 unit/L - 46 unit/L] (08/15/19 08:42:00)          Protein Total: 7 gm/dL [6.1 gm/dL - 8.1 gm/dL] (08/15/19 08:42:00)          Albumin Level: 4.4 gm/dL [3.6 gm/dL - 5.1 gm/dL] (08/15/19 08:42:00)          Globulin: 2.6 [1.9  - 3.7] (08/15/19 08:42:00)          A/G Ratio: 1.7 [1  - 2.5] (08/15/19 08:42:00)          Cholesterol: 216 mg/dL High (08/15/19 08:42:00)          Non-HDL Cholesterol: 156 High (08/15/19 08:42:00)          HDL: 60 mg/dL (08/15/19 08:42:00)          Cholesterol/HDL Ratio: 3.6 (08/15/19 08:42:00)          LDL: 137 High (08/15/19 08:42:00)          Triglyceride: 87 mg/dL (08/15/19 08:42:00)

## 2022-02-15 NOTE — NURSING NOTE
Comprehensive Intake Entered On:  12/26/2019 8:03 AM CST    Performed On:  12/26/2019 8:01 AM CST by Leilani Bolaños CMA               Summary   Chief Complaint :   Paxil med check; things are going good   Menstrual Status :   N/A   Weight Measured :   186.0 lb(Converted to: 186 lb 0 oz, 84.37 kg)    Height Measured :   69 in(Converted to: 5 ft 9 in, 175.26 cm)    Body Mass Index :   27.46 kg/m2 (HI)    Body Surface Area :   2.02 m2   Systolic Blood Pressure :   108 mmHg   Diastolic Blood Pressure :   70 mmHg   Mean Arterial Pressure :   83 mmHg   Peripheral Pulse Rate :   75 bpm   Oxygen Saturation :   97 %   Leilani Bolaños CMA - 12/26/2019 8:01 AM CST   Health Status   Allergies Verified? :   Yes   Medication History Verified? :   Yes   Immunizations Current :   Unknown   Medical History Verified? :   Yes   Pre-Visit Planning Status :   Completed   Tobacco Use? :   Former smoker   Leilani Bolaños CMA - 12/26/2019 8:01 AM CST   Consents   Consent for Immunization Exchange :   Consent Granted   Consent for Immunizations to Providers :   Consent Granted   Leilani Bolaños CMA - 12/26/2019 8:01 AM CST   Meds / Allergies   (As Of: 12/26/2019 8:03:36 AM CST)   Allergies (Active)   Aspirin Adult Low Strength  Estimated Onset Date:   Unspecified ; Reactions:   Stomach upset ; Created By:   Claudia Barrera LPN; Reaction Status:   Active ; Category:   Drug ; Substance:   Aspirin Adult Low Strength ; Type:   Allergy ; Updated By:   Claudia Barrera LPN; Reviewed Date:   12/26/2019 8:02 AM CST      Celexa  Estimated Onset Date:   Unspecified ; Reactions:   Agitated ; Created By:   America Montelongo; Reaction Status:   Active ; Category:   Drug ; Substance:   Celexa ; Type:   Allergy ; Updated By:   America Montelongo; Source:   Paper Chart ; Reviewed Date:   12/26/2019 8:02 AM CST      melatonin  Estimated Onset Date:   Unspecified ; Reactions:   Anxiety,nausea,hot flashes ; Created By:   Claudia Barrera LPN; Reaction Status:   Active ;  Category:   Drug ; Substance:   melatonin ; Type:   Allergy ; Updated By:   Claudia Barrera LPN; Reviewed Date:   12/26/2019 8:02 AM CST        Medication List   (As Of: 12/26/2019 8:03:36 AM CST)   Prescription/Discharge Order    PARoxetine  :   PARoxetine ; Status:   Prescribed ; Ordered As Mnemonic:   Paxil 30 mg oral tablet ; Simple Display Line:   30 mg, 1 tab(s), PO, Daily, 90 tab(s), 0 Refill(s) ; Ordering Provider:   Juli Galvan MD; Catalog Code:   PARoxetine ; Order Dt/Tm:   8/13/2019 10:47:52 AM CDT          sildenafil  :   sildenafil ; Status:   Prescribed ; Ordered As Mnemonic:   Viagra 100 mg oral tablet ; Simple Display Line:   100 mg, 1 tab(s), PO, daily, 9 tab(s), 11 Refill(s) ; Ordering Provider:   Juli Galvan MD; Catalog Code:   sildenafil ; Order Dt/Tm:   4/7/2016 9:12:20 AM CDT

## 2022-02-15 NOTE — NURSING NOTE
Comprehensive Intake Entered On:  3/10/2020 3:15 PM CDT    Performed On:  3/10/2020 3:14 PM CDT by Shivani Amador CMA               Summary   Chief Complaint :   f/u CKD results   Menstrual Status :   N/A   Weight Measured :   189 lb(Converted to: 189 lb 0 oz, 85.73 kg)    Height Measured :   69 in(Converted to: 5 ft 9 in, 175.26 cm)    Body Mass Index :   27.91 kg/m2 (HI)    Body Surface Area :   2.04 m2   Systolic Blood Pressure :   116 mmHg   Diastolic Blood Pressure :   70 mmHg   Mean Arterial Pressure :   85 mmHg   Peripheral Pulse Rate :   64 bpm   Temperature Tympanic :   97 DegF(Converted to: 36.1 DegC)  (LOW)    Shivani Amador CMA - 3/10/2020 3:14 PM CDT

## 2022-02-15 NOTE — PROGRESS NOTES
Patient:   SALUD TRUJILLO            MRN: [id number]            FIN: [id number]               Age:   52 years     Sex:  Male     :  1965   Associated Diagnoses:   Actinic keratosis   Author:   Juli Galvan MD      Chief Complaint   2017 3:46 PM CDT    Patient here for lesion on left ear x 6 months, that will not heal.        History of Present Illness   Left ear with non-healing lesion. Small scab. Not painful. Does feel warm at times. No bleeding.       Health Status   Allergies:    Allergic Reactions (All)  Severity Not Documented  Aspirin Adult Low Strength (Stomach upset)  Celexa (Agitated)  Melatonin (Anxiety,nausea,hot flashes)   Medications:  (Selected)   Prescriptions  Prescribed  Paxil 30 mg oral tablet: 1 tab(s) ( 30 mg ), PO, Daily, # 90 tab(s), 3 Refill(s), Type: Maintenance, Pharmacy: [id number], 1 tab(s) po daily  Viagra 100 mg oral tablet: 1 tab(s) ( 100 mg ), PO, daily, # 9 tab(s), 11 Refill(s), Type: Soft Stop, Pharmacy: [facility] #[id number], 1 tab(s) po daily      Histories   Past Medical History:    Resolved  Moderate recurrent major depression (ICD-9-.32):  Resolved.  Vestibular schwannoma (SNOMED CT 393967049):  Resolved.   Family History:    CA - Cancer  Father ()     Procedure history:    Colonoscopy ([id number]) on 2016 at 50 Years.  Comments:  2016 8:18 AM - Delon Patel MD  Indication: Screening  Sedation: Versed 3 mg, Fentanyl 150 mcg  Findings: Mild Sigmoid diverticulosis  Recommendation: Repeat in 10 years  Esophagogastroduodenoscopy (0357675457) on 2011 at 45 Years.  Removal of vestibular schwannoma (924155364) in  at 43 Years.  Tonsillectomy and adenoidectomy ([id number]).      Physical Examination   Vital Signs   2017 3:46 PM CDT Peripheral Pulse Rate 75 bpm    Systolic Blood Pressure 108 mmHg    Diastolic Blood Pressure 66 mmHg    Mean Arterial Pressure 80 mmHg    Oxygen Saturation 96 %      Measurements from flowsheet :  Measurements   9/14/2017 3:46 PM CDT    Weight Measured - Standard                187.4 lb     General:  Alert and oriented, No acute distress.    Integumentary:  left ear with 5mm area of scabbing noted.       Review / Management   Lesion treated with liquid nitrogen in a 3 freeze thaw cycle.  Total 1 lesion treated.       Impression and Plan   Diagnosis     Actinic keratosis (IKG55-ZR L57.0).     Plan:  If not fully healing let me know and I will refer on to dermatology..

## 2022-02-15 NOTE — NURSING NOTE
Phone Message    PCP:   BLU      Time of Call:  8:42 am    Phone number:  [phone]    Returned call at: 9:55 am    Note:   Pt called stating that he went into the ER on Tuesday morning with terrible pain. He was diagnosed with kidney stones. He was given Hydrocodone-Acetaminophen for pain and was sent home to pass the stones. He states that as of this morning he has not passed any. He was given #12 tabs and worried about running out during the weekend. Doesn't want to have to go back to that pain he felt on Tuesday. He states that he is taking 2-3 per day depending upon the pain. Advised that he will need to make an apt with a provider to get a refill of the pain medication. Suggested to wait until tomorrow afternoon incase he passes them prior to the weekend. Pt made an apt for Friday afternoon and will cancel if not necessary.     Pharmacy: n/a    Last office visit and reason: 10/2018; depression    Transferred to: n/a

## 2022-02-15 NOTE — PROGRESS NOTES
Chief Complaint    f/u skin exam, has spot on back.  History of Present Illness      Patient is here for follow-up on skin cancer.  He has had a basal cell carcinoma in the past.  He continues to be careful with sun exposure.  No new skin lesions reported.  Chart is been reviewed.  Review of Systems      See HPI.  All other review of systems negative.  Physical Exam   Vitals & Measurements    T: 98   F (Tympanic)  HR: 73(Peripheral)  BP: 112/76  SpO2: 96%     HT: 69 in  WT: 189.8 lb  BMI: 28.03       Alert, oriented, no acute distress      Normal heart rate      Nonlabored breathing       Skin exam reveals mild photoaging of the arms and face and scalp.       Head neck: No suspicious lesions       Trunk: No suspicious lesions, scar formation at site of BCC removal left mid back       Upper extremities: No suspicious lesions       Lower extremities: No suspicious lesions  Assessment/Plan       BCC (basal cell carcinoma) (C44.91)         History of previous basal cell carcinoma.  Skin check today reveals no new lesions, no suspicious lesions.  Counseled on sun protection, and continued observation for new and/or suspicious lesions.  Recheck in 1 year  Patient Information     Name:RONNIE SALUD HART      Address:      [address] [id number]     Sex:Male     YOB: 1965     [phone]     Emergency Contact:MARILEE TRUJILLO     MRN:[id number]     FIN:[id number]     Location[facility]     Date of Service:06/09/2020      Primary Care Physician:       Juli Galvan MD, [phone]      Attending Physician:       Smith Stovall MD, [phone]  Problem List/Past Medical History    Ongoing     BCC (basal cell carcinoma)     Major depression in full remission    Historical     Moderate recurrent major depression     Vestibular schwannoma  Procedure/Surgical History     Lithotripsy using laser (05/17/2019)     Colonoscopy (06/01/2016)      Comments:  Indication: Screening      Sedation: Versed 3 mg, Fentanyl 150 mcg      Findings: Mild Sigmoid diverticulosis      Recommendation: Repeat in 10 years.     Esophagogastroduodenoscopy (01/28/2011)     Removal of vestibular schwannoma (2008)     Tonsillectomy and adenoidectomy  Medications    Paxil 30 mg oral tablet, 30 mg= 1 tab(s), Oral, daily, 3 refills    Viagra 100 mg oral tablet, 100 mg= 1 tab(s), Oral, daily, 11 refills  Allergies    Aspirin Adult Low Strength (Stomach upset)    Celexa (Agitated)    melatonin (Anxiety,nausea,hot flashes)  Social History    Smoking Status - 06/09/2020     Former smoker     Alcohol      Beer (12 oz), 3 x's per week., 2 drinks/episode average. 3 drinks/episode maximum., 04/07/2016     Employment/School      Employed, Work/School description: ., 04/07/2016     Exercise - Does not exercise, 04/07/2016     Home/Environment      Marital status:  (Living together). Spouse/Partner name: Kortney. Lives with Spouse. Living situation: Home/Independent., 04/07/2016     Nutrition/Health      Type of diet: Regular., 04/07/2016     Substance Abuse      Never, 04/07/2016     Tobacco - Denies Tobacco Use, 04/19/2010      Never smoker, 04/24/2017  Family History    CA - Cancer: Father.    CA - Lung cancer: Father.    Pancreatic cancer: Mother.  Immunizations      Vaccine Date Status          tetanus/diphth/pertuss (Tdap) adult/adol 03/10/2015 Given          Td 06/09/2005 Recorded

## 2022-02-15 NOTE — PROGRESS NOTES
Patient:   SALUD TRUJILLO            MRN: [id number]            FIN: [id number]               Age:   55 years     Sex:  Male     :  1965   Associated Diagnoses:   Major depression in full remission   Author:   Juli Galvan MD      Chief Complaint   2/3/2021 3:20 PM CST     Depression f/u and med refills.      Interval History   patient doing very well on current regimen  notes mood has been excellent  no side effects noted  patient is feeling well  willing to consider tapering dose      Health Status   Allergies:    Allergic Reactions (All)  Severity Not Documented  Aspirin Adult Low Strength (Stomach upset)  Celexa (Agitated)  Melatonin (Anxiety,nausea,hot flashes)   Medications:  (Selected)   Prescriptions  Prescribed  PARoxetine 30 mg oral tablet: = 1 tab(s), Oral, daily, # 30 tab(s), 0 Refill(s), Type: Maintenance, Pharmacy: [facility] #[id number], TAKE 1 TABLET BY MOUTH DAILY, 69, in, 20 13:38:00 CDT, Height Measured, 189.8, lb, 20 13:38:00 CDT, Weight Measured  Viagra 100 mg oral tablet: 1 tab(s) ( 100 mg ), PO, daily, # 9 tab(s), 11 Refill(s), Type: Soft Stop, [id number], 1 tab(s) po daily   Problem list:    All Problems  Major depression in full remission / SNOMED CT 29976830 / Confirmed  BCC (basal cell carcinoma) / SNOMED CT 943818431 / Confirmed      Histories   Past Medical History:    Resolved  Moderate recurrent major depression (ICD-9-.32):  Resolved.  Vestibular schwannoma (SNOMED CT 450210206):  Resolved.   Family History:    CA - Lung cancer  Father ()  Pancreatic cancer  Mother ()  CA - Cancer  Father ()     Procedure history:    Lithotripsy using laser ([id number]) on 2019 at 53 Years.  Colonoscopy ([id number]) on 2016 at 50 Years.  Comments:  2016 8:18 AM CDT - Delon Patel MD  Indication: Screening  Sedation: Versed 3 mg, Fentanyl 150 mcg  Findings: Mild Sigmoid diverticulosis  Recommendation: Repeat in  10 years  Esophagogastroduodenoscopy ([id number]) on 1/28/2011 at 45 Years.  Removal of vestibular schwannoma ([id number]) in 2008 at 43 Years.  Tonsillectomy and adenoidectomy (218057606).   Social History:        Electronic Cigarette/Vaping Assessment: Denies Electronic Cigarette Use            Electronic Cigarette Use: Never.      Alcohol Assessment            Beer (12 oz), 3 x's per week., 2 drinks/episode average.  3 drinks/episode maximum.      Tobacco Assessment: Denies Tobacco Use            Never (less than 100 in lifetime)      Substance Abuse Assessment            Never      Employment and Education Assessment            Employed, Work/School description: .      Home and Environment Assessment            Marital status:  (Living together).  Spouse/Partner name: Kortney.  Lives with Spouse.  Living situation:               Home/Independent.      Nutrition and Health Assessment            Type of diet: Regular.      Exercise and Physical Activity Assessment: Does not exercise        Physical Examination   Vital Signs   2/3/2021 3:20 PM CST Temperature Tympanic 97.9 DegF    Peripheral Pulse Rate 85 bpm    Systolic Blood Pressure 104 mmHg    Diastolic Blood Pressure 70 mmHg    Mean Arterial Pressure 81 mmHg    BP Site Right arm    BP Method Manual    Oxygen Saturation 96 %      Measurements from flowsheet : Measurements   2/3/2021 3:20 PM CST Height Measured - Standard 69 in    Weight Measured - Standard 192 lb    BSA 2.06 m2    Body Mass Index 28.35 kg/m2  HI      General:  Alert and oriented, No acute distress.    Respiratory:  Lungs are clear to auscultation, Respirations are non-labored.    Cardiovascular:  Normal rate, Regular rhythm.       Impression and Plan   Diagnosis     Major depression in full remission (BWA47-WN F32.5).     Plan:  will decrease dose of paroxetine to 20mg.    Orders     Orders (Selected)   Outpatient Orders  Ordered  Return to Clinic (Request): RFV: Annual px and  fasting lipid panel and FBS, Return in Sept 2021  Prescriptions  Prescribed  PARoxetine 20 mg oral tablet: = 1 tab(s) ( 20 mg ), Oral, daily, # 90 tab(s), 3 Refill(s), Type: Maintenance, Pharmacy: [facility] #[id number], 1 tab(s) Oral daily, 69, in, 02/03/21 15:20:00 CST, Height Measured, 192, lb, 02/03/21 15:20:00 CST, Weight Measured.

## 2022-02-15 NOTE — LETTER
(Inserted Image. Unable to display)   January 16, 2020      SALUD TRUJILLO   [address], [place] [id number]        Dear SALUD,     Thank you for selecting [facility] ([facility]viously [facility] for your healthcare needs. Below you will find the results of your recent test(s) done at our clinic.      Labs for your review.  Nothing very remarkable.  Creatinine remains mildly elevated.  We can discuss next steps when I see you back for follow-up.        Result Name Current Result Previous Result Reference Range   Sodium Level (mmol/L)  138 1/14/2020  141 12/19/2019 135 - 146   Potassium Level (mmol/L)  4.3 1/14/2020  4.5 12/19/2019 3.5 - 5.3   Chloride Level (mmol/L)  103 1/14/2020  104 12/19/2019 98 - 110   CO2 Level (mmol/L)  28 1/14/2020  28 12/19/2019 20 - 32   Glucose Level (mg/dL)  84 1/14/2020 ((H)) 101 12/19/2019 65 - 139   BUN (mg/dL) ((H)) 30 1/14/2020  23 12/19/2019 7 - 25   Creatinine Level (mg/dL) ((H)) 1.44 1/14/2020 ((H)) 1.51 12/19/2019 0.70 - 1.33   BUN/Creat Ratio  21 1/14/2020  15 12/19/2019 6 - 22   eGFR (mL/min/1.73m2) ((L)) 55 1/14/2020 ((L)) 52 12/19/2019 > OR = 60 -    eGFR  (mL/min/1.73m2)  63 1/14/2020  60 12/19/2019 > OR = 60 -    Calcium Level (mg/dL)  9.5 1/14/2020  10.0 12/19/2019 8.6 - 10.3   PTH Intact (pg/mL)  43 1/14/2020  14 - 64   Albumin Level (gm/dL)  4.4 1/14/2020  4.4 8/15/2019 3.6 - 5.1   U Protein (mg/dL)  15 1/14/2020  5 - 25   U Protein/Creatinine Ratio  114 1/14/2020  0.114 1/14/2020 22 - 128   U Protein/Creatinine Ratio  0.114 1/14/2020  114 1/14/2020 0.022 - 0.128   Ur Creatinine (mg/dL)  132 1/14/2020  20 - 320   Hgb (gm/dL)  15.3 1/14/2020  13.2 - 17.1   UA Color  YELLOW 1/14/2020  YELLOW -    UA Appear  CLEAR 1/14/2020  CLEAR -    UA pH  6.0 1/14/2020  5.0 - 8.0   UA Specific [place]  1.022 1/14/2020  1.001 - 1.035   UA Glucose  NEGATIVE 1/14/2020  NEGATIVE - NEGATIVE   UA Bilirubin  NEGATIVE 1/14/2020  NEGATIVE -  NEGATIVE   UA Ketones  NEGATIVE 1/14/2020  NEGATIVE - NEGATIVE   UA Blood  NEGATIVE 1/14/2020  NEGATIVE - NEGATIVE   UA Protein  NEGATIVE 1/14/2020  NEGATIVE - NEGATIVE   UA Nitrite  NEGATIVE 1/14/2020  NEGATIVE - NEGATIVE   UA Leukocyte Esterase  NEGATIVE 1/14/2020  NEGATIVE - NEGATIVE   UA Squamous Epithelial Cells (/HPF)  NONE SEEN 1/14/2020   - < OR = 5   UA Hyaline Cast (/LPF)  NONE SEEN 1/14/2020  NONE SEEN -    UA WBC (/HPF)  NONE SEEN 1/14/2020   - < OR = 5   UA RBC (/HPF)  NONE SEEN 1/14/2020   - < OR = 2   UA Calcium Oxalate Crystals (/HPF)  FEW 1/14/2020  NONE OR FEW -    UA Bacteria (/HPF)  NONE SEEN 1/14/2020  NONE SEEN -        Please contact me or my assistant at [phone] if you have any questions or concerns.     Sincerely,        Malcolm Herrera MD    What do your labs mean?  Below is a glossary of commonly ordered labs:  LDL - Bad Cholesterol  HDL - Good Cholesterol  AST/ALT - Liver Function  Cr/Creatinine - Kidney Function  Microalbumin - Kidney Function  BUN - Kidney Function  PSA - Prostate   TSH - Thyroid Hormone  HgbA1c - Diabetes Test  Hgb (Hemoglobin) - Red Blood Cells

## 2022-02-15 NOTE — PROGRESS NOTES
Patient:   SALUD TRUJILLO            MRN: [id number]            FIN: [id number]               Age:   53 years     Sex:  Male     :  1965   Associated Diagnoses:   Major depression in full remission; Major depression in full remission   Author:   Juli Galvan MD      Visit Information      Date of Service: 10/29/2018 03:24 pm  Performing Location: [facility]  Encounter#: [id number]      Primary Care Provider (PCP):  Juli Galvan MD    NPI# [id number]      Referring Provider:  Juli Galvan MD    NPI# [id number]      Chief Complaint   10/29/2018 3:31 PM CDT   Renewal of medications      History of Present Illness   Chief complaint reviewed and confirmed with patient.    Patient is here to refill medication - paroxetine.  Tolerating the medication well and wants to continue on current dose.  Overall, reports his mood has been good and depression symptoms have been stable.  Some days, still feels more down and decreased energy.    Not interfering with his daily activities or work.    Patient reports no other concerns or questions to address today.      Review of Systems   Psychiatric:  Negative.       Health Status   Allergies:    Allergic Reactions (Selected)  Severity Not Documented  Aspirin Adult Low Strength (Stomach upset)  Celexa (Agitated)  Melatonin (Anxiety,nausea,hot flashes)   Medications:  (Selected)   Prescriptions  Prescribed  Paxil 30 mg oral tablet: 1 tab(s) ( 30 mg ), PO, Daily, # 90 tab(s), 3 Refill(s), Type: Maintenance, Pharmacy: [facility] #[id number], 1 tab(s) po daily  Viagra 100 mg oral tablet: 1 tab(s) ( 100 mg ), PO, daily, # 9 tab(s), 11 Refill(s), Type: Soft Stop, Pharmacy: [facility] #[id number], 1 tab(s) po daily,    Medications          *denotes recorded medication          Paxil 30 mg oral tablet: 30 mg, 1 tab(s), PO, Daily, 90 tab(s), 3 Refill(s).          Viagra 100 mg oral tablet: 100 mg, 1 tab(s), PO, daily, 9 tab(s), 11 Refill(s).        Histories   Past  Medical History:    Resolved  Moderate recurrent major depression (296.32):  Resolved.  Vestibular schwannoma (475721878):  Resolved.   Family History:    CA - Cancer  Father ()     Procedure history:    Colonoscopy (588071243) on 2016 at 50 Years.  Comments:  2016 8:18 AM - Delon Patel MD  Indication: Screening  Sedation: Versed 3 mg, Fentanyl 150 mcg  Findings: Mild Sigmoid diverticulosis  Recommendation: Repeat in 10 years  Esophagogastroduodenoscopy (2641336459) on 2011 at 45 Years.  Removal of vestibular schwannoma (253376157) in  at 43 Years.  Tonsillectomy and adenoidectomy (470942061).      Physical Examination   Vital Signs   10/29/2018 3:31 PM CDT Temperature Tympanic 97.9 DegF    Peripheral Pulse Rate 66 bpm    Pulse Site Radial artery    HR Method Manual    Systolic Blood Pressure 100 mmHg    Diastolic Blood Pressure 60 mmHg    Mean Arterial Pressure 73 mmHg    BP Site Right arm    BP Method Manual      Measurements from flowsheet : Measurements   10/29/2018 3:31 PM CDT Height Measured - Standard 69 in    Weight Measured - Standard 187.8 lb    BSA 2.03 m2    Body Mass Index 27.73 kg/m2  HI      General:  Alert and oriented, No acute distress.    Psychiatric:  Cooperative, Appropriate mood & affect, PHQ-9 reviewed and discussed with patient.       Health Maintenance      Impression and Plan   Diagnosis     Major depression in full remission (SDH72-HE F32.5).     Plan:  Continue current medication/dose for depression.  Will repeat fasting labs next year.  Follow up if any changes or concerns, or in one year..    Patient Instructions:       Counseled: Patient, Regarding diagnosis, Regarding medications.       Professional Services     Note by LUKE Adorno  Patient was seen with student and history and exam confirmed. Agree that documentation reflects findings and plan.  Juli Galvan MD

## 2022-02-15 NOTE — PROGRESS NOTES
Patient:   SALUD TRUJILLO            MRN: [id number]            FIN: [id number]               Age:   55 years     Sex:  Male     :  1965   Associated Diagnoses:   Chronic kidney disease (CKD), stage III (moderate)   Author:   Juli Galvan MD      Visit Information      Date of Service: 2021 02:47 pm  Performing Location: [facility]  Encounter#: [id number]      Primary Care Provider (PCP):  Juli Galvan MD    NPI# [id number]      Referring Provider:  Juli Galvan MD    NPI# [id number]      Chief Complaint   2021 3:12 PM CDT    F/u labs-states recheck after having a kidney stone.      History of Present Illness   patient here for routine follow up  has been feeling well and has no concerns  reviewed labs, kidney function remains mildly affected but slight improvements  avoids NSAIDs      Health Status   Allergies:    Allergic Reactions (All)  Severity Not Documented  Aspirin Adult Low Strength (Stomach upset)  Celexa (Agitated)  Melatonin (Anxiety,nausea,hot flashes)   Medications:  (Selected)   Prescriptions  Prescribed  PARoxetine 20 mg oral tablet: = 1 tab(s) ( 20 mg ), Oral, daily, # 90 tab(s), 3 Refill(s), Type: Maintenance, Pharmacy: [facility] #[id number], 1 tab(s) Oral daily, 69, in, 21 15:20:00 CST, Height Measured, 192, lb, 21 15:20:00 CST, Weight Measured  Viagra 100 mg oral tablet: 1 tab(s) ( 100 mg ), PO, daily, # 9 tab(s), 11 Refill(s), Type: Soft Stop, Pharmacy: [facility] #[id number], 1 tab(s) po daily   Problem list:    All Problems (Selected)  BCC (basal cell carcinoma) / SNOMED CT 275330011 / Confirmed  Major depression in full remission / SNOMED CT 35815612 / Confirmed      Histories   Past Medical History:    Resolved  Moderate recurrent major depression (ICD-9-.32):  Resolved.  Vestibular schwannoma (SNOMED CT 578030929):  Resolved.   Family History:    CA - Lung cancer  Father ()  Pancreatic cancer  Mother  ()  CA - Cancer  Father ()     Procedure history:    Lithotripsy using laser (0191247192) on 2019 at 53 Years.  Colonoscopy (682190065) on 2016 at 50 Years.  Comments:  2016 8:18 AM CDT - Delon Patel MD  Indication: Screening  Sedation: Versed 3 mg, Fentanyl 150 mcg  Findings: Mild Sigmoid diverticulosis  Recommendation: Repeat in 10 years  Esophagogastroduodenoscopy (1327235850) on 2011 at 45 Years.  Removal of vestibular schwannoma (088842435) in  at 43 Years.  Tonsillectomy and adenoidectomy (682033750).   Social History:        Electronic Cigarette/Vaping Assessment: Denies Electronic Cigarette Use            Electronic Cigarette Use: Never.      Alcohol Assessment            Beer (12 oz), 3 x's per week., 2 drinks/episode average.  3 drinks/episode maximum.      Tobacco Assessment: Denies Tobacco Use            Never (less than 100 in lifetime)      Substance Abuse Assessment            Never      Employment and Education Assessment            Employed, Work/School description: .      Home and Environment Assessment            Marital status:  (Living together).  Spouse/Partner name: Kortney.  Lives with Spouse.  Living situation:               Home/Independent.      Nutrition and Health Assessment            Type of diet: Regular.      Exercise and Physical Activity Assessment: Does not exercise        Physical Examination   Vital Signs   2021 3:12 PM CDT Temperature Tympanic 96.4 DegF  LOW    Peripheral Pulse Rate 77 bpm    Systolic Blood Pressure 120 mmHg    Diastolic Blood Pressure 83 mmHg  HI    Mean Arterial Pressure 95 mmHg    BP Site Right arm    BP Method Electronic    Oxygen Saturation 99 %      Measurements from flowsheet : Measurements   2021 3:12 PM CDT Height Measured - Standard 69 in    Weight Measured - Standard 194 lb    BSA 2.07 m2    Body Mass Index 28.65 kg/m2  HI      General:  Alert and oriented, No acute distress.     Respiratory:  Lungs are clear to auscultation, Respirations are non-labored.    Cardiovascular:  Normal rate, Regular rhythm.    Neurologic:  Alert, Oriented.    Psychiatric:  Cooperative, Appropriate mood & affect.       Review / Management   Results review:  Lab results   4/6/2021 4:35 PM CDT Sodium Level 138 mmol/L    Potassium Level 3.8 mmol/L    Chloride Level 104 mmol/L    CO2 Level 26 mmol/L    Glucose Level 79 mg/dL    BUN 17 mg/dL    Creatinine 1.39 mg/dL  HI    BUN/Creat Ratio 12    eGFR 57 mL/min/1.73m2  LOW    eGFR African American 66 mL/min/1.73m2    Calcium Level 9.8 mg/dL    Phosphorus Level 2.9 mg/dL    PTH Intact 43 pg/mL    U Creatinine 17 mg/dL  LOW    U Microalbumin <0.2 mg/dL    Ur Microalb/Creat Ratio NOTE    Hgb 15.8 gm/dL    UA Color YELLOW    UA Appear CLEAR    UA pH 6.5    UA Specific Gravity 1.003    UA Glucose NEGATIVE    UA Bilirubin NEGATIVE    UA Ketones NEGATIVE    UA Blood NEGATIVE    UA Protein NEGATIVE    UA Nitrite NEGATIVE    UA Leuk Est NEGATIVE    UA Squam Epithelial NONE SEEN /HPF    UA Hyaline Cast NONE SEEN /LPF    UA WBC NONE SEEN /HPF    UA RBC NONE SEEN /HPF    UA Bacteria NONE SEEN /HPF   .       Impression and Plan   Diagnosis     Chronic kidney disease (CKD), stage III (moderate) (OBT25-BU N18.3).     Course:  stable, no changes, repeat labs in one year.    Orders     Orders (Selected)   Outpatient Orders  Ordered  Return to Clinic (Request): Return in 1 year; CKD3 labs + complete UA then follow up with [facility] or [facility].

## 2022-02-15 NOTE — TELEPHONE ENCOUNTER
---------------------  From: Audelia Amaro CMA (Phone Messages Pool ([id number]_[facility]))   To: Juli Galvan MD;     Sent: 1/9/2020 11:14:45 AM CST  Subject: Phone Note: F/U elevated creatinine     Phone Message    PCP:   [facility]      Time of Call:  10:22 am    Phone number:  [phone]    Returned call at: 11:00 am    Note:   Pt called stating that he had blood work in August and his creatinine was elevated at 1.51. He had this recheck in December and the creatinine remained the same. He was having some kidney stones at that time but have since subsided. States he has not had any pain and able to urinate like normal. Wondering if there is anything needed to help get this number down? Also, I reviewed his glucose and cholesterol and states he has not made any diet or exercise changes. Discuss the risks of these being elevated and he is willing to start with diet and rechecking again next year. Please advise on creatinine.     Pharmacy: [facility]    Last office visit and reason: 12/26/19; depression with [facility]    Transferred to: Abhijit recommendation would be a consult with Dr. Herrera. Since I don't see any medications that he takes regularly that would be causing it, there isn't an easy answer as to something that would bring it down. Consulting with a nephrologist would help make sure we are doing everything we can to protect kidney function.---------------------  From: Juli Galvan MD   To: Phone Messages Pool ([id number]_[place] [facility]);     Sent: 1/9/2020 11:24:00 AM CST  Subject: RE: Phone Note: F/U elevated creatinineReturned Call  Time: 11:52 pm  Note:  Pt was called & it was discussed. He had no further questions and was willing to f/u with Dr. Herrera in clinic. Transferred to scheduling.

## 2022-02-15 NOTE — PROGRESS NOTES
Patient:   SALUD TRUJILLO            MRN: [id number]            FIN: [id number]               Age:   53 years     Sex:  Male     :  1965   Associated Diagnoses:   Preop examination; Kidney stone; Elevated serum creatinine; Pulmonary nodules   Author:   Juli Galvan MD      Chief Complaint   2019 9:07 AM CDT    pre op @ [facility] 18 for kidney stone removal     patient with new onset kidney stones starting in April  pain had resolved but then recurred earlier this week  scheduled for removal on 19 at [facility] recent tests, creatinine noted to be elevated, new for patient, anticipate related to kidney stone, will recheck after surgery  patient reports he generally has felt well other than the episodes of pain      Review of Systems   Constitutional:  Fatigue.    Eye:  Negative.    Ear/Nose/Mouth/Throat:  Decreased hearing, tinnitus.    Respiratory:  snoring.    Cardiovascular:  Negative.    Gastrointestinal:  Negative.    Genitourinary:  negative except per HPI.    Hematology/Lymphatics:  Negative.    Endocrine:  Negative.    Immunologic:  Negative.    Musculoskeletal:  Negative.    Integumentary:  Negative.    Neurologic:  Negative.    Psychiatric:  Negative.       Health Status   Allergies:    Allergic Reactions (All)  Severity Not Documented  Aspirin Adult Low Strength (Stomach upset)  Celexa (Agitated)  Melatonin (Anxiety,nausea,hot flashes)   Medications:  (Selected)   Prescriptions  Prescribed  Paxil 30 mg oral tablet: = 1 tab(s) ( 30 mg ), PO, Daily, # 90 tab(s), 3 Refill(s), Type: Maintenance, Pharmacy: [facility] #[id number], 1 tab(s) Oral daily  Viagra 100 mg oral tablet: 1 tab(s) ( 100 mg ), PO, daily, # 9 tab(s), 11 Refill(s), Type: Soft Stop, Pharmacy: [facility] #[id number], 1 tab(s) po daily  Documented Medications  Documented  Flomax 0.4 mg oral capsule: = 1 cap(s) ( 0.4 mg ), Oral, daily, 0 Refill(s), Type: Maintenance  Norco 5 mg-325 mg oral tablet: 1 tab(s), PO,  q4hr, PRN: for pain, 0 Refill(s), Type: Maintenance   Problem list:    All Problems  Major depression in full remission / SNOMED CT 85275324 / Confirmed      Histories   Past Medical History:    Resolved  Moderate recurrent major depression (ICD-9-.32):  Resolved.  Vestibular schwannoma (SNOMED CT 105085581):  Resolved.   Family History:    Mother:  ()  Cause of Death: cancer  Age at Death: 68 years.   Pancreatic cancer  Father:  ()  Cause of Death: cancer  Age at Death: 52 years.   CA - Cancer  CA - Lung cancer     Procedure history:    Colonoscopy (371538700) on 2016 at 50 Years.  Comments:  2016 8:18 AM CDT - Delon Patel MD  Indication: Screening  Sedation: Versed 3 mg, Fentanyl 150 mcg  Findings: Mild Sigmoid diverticulosis  Recommendation: Repeat in 10 years  Esophagogastroduodenoscopy (9346669895) on 2011 at 45 Years.  Removal of vestibular schwannoma (555231689) in  at 43 Years.  Tonsillectomy and adenoidectomy (890446472).   Social History:        Alcohol Assessment            Beer (12 oz), 3 x's per week., 2 drinks/episode average.  3 drinks/episode maximum.      Tobacco Assessment: Denies Tobacco Use            Never smoker      Substance Abuse Assessment            Never      Employment and Education Assessment            Employed, Work/School description: .      Home and Environment Assessment            Marital status:  (Living together).  Spouse/Partner name: Kortney.  Lives with Spouse.  Living situation:               Home/Independent.      Nutrition and Health Assessment            Type of diet: Regular.      Exercise and Physical Activity Assessment: Does not exercise      Physical Examination   Vital Signs   2019 9:07 AM CDT Temperature Tympanic 98.0 DegF    Peripheral Pulse Rate 77 bpm    Pulse Site Radial artery    HR Method Manual    Systolic Blood Pressure 102 mmHg    Diastolic Blood Pressure 68 mmHg    Mean Arterial Pressure 79  mmHg    BP Site Right arm    BP Method Manual    Oxygen Saturation 94 %      Measurements from flowsheet : Measurements   5/16/2019 9:07 AM CDT Height Measured - Standard 69 in    Weight Measured - Standard 185.8 lb    BSA 2.02 m2    Body Mass Index 27.43 kg/m2  HI      General:  Alert and oriented, No acute distress.    Eye:  Pupils are equal, round and reactive to light, Normal conjunctiva.    HENT:  Normocephalic, Tympanic membranes are clear, Normal hearing, No pharyngeal erythema.    Neck:  Supple, Non-tender, No lymphadenopathy, No thyromegaly.    Respiratory:  Lungs are clear to auscultation, Respirations are non-labored, Breath sounds are equal.    Cardiovascular:  Normal rate, Regular rhythm.    Gastrointestinal:  Soft, Non-tender, Non-distended, Normal bowel sounds, No organomegaly.    Musculoskeletal:  Normal range of motion, No deformity.    Integumentary:  Warm, Dry.    Neurologic:  Alert, Oriented.       Review / Management   Results review:  Labs from [facility] reviewed. CBC normal. Creatinine noted to be elevated, likely related to obstruction from stone. Will plan to recheck in 4 weeks after surgery..       Impression and Plan   Diagnosis     Preop examination (INM77-JS Z01.818).     Kidney stone (QHP48-AJ N20.0).     Elevated serum creatinine (EOP97-LV R79.89).     Orders     Orders (Selected)   Outpatient Orders  Ordered  Return to Clinic (Request): RFV: lab visit: chem 14, lipid, Return in 4 weeks  Documented Medications  Documented  Flomax 0.4 mg oral capsule: = 1 cap(s) ( 0.4 mg ), Oral, daily, 0 Refill(s), Type: Maintenance  Norco 5 mg-325 mg oral tablet: 1 tab(s), PO, q4hr, PRN: for pain, 0 Refill(s), Type: Maintenance.     Diagnosis     Pulmonary nodules (LAP35-AW R91.8).     Course:  incidental 4mm pulmonary nodule noted on abdominal CT, dedicated chest CT recommended..    Orders     Orders (Selected)   Outpatient Orders  Ordered  CT Chest w/o Contrast (Request): Pulmonary nodules.

## 2022-02-15 NOTE — LETTER
(Inserted Image. Unable to display)   December 28, 2020      SALUD TRUJILLO   [address], [place] [id number]        Dear SALUD,      Thank you for selecting [facility] (previously [facility]) for your healthcare needs.     Our records indicate you are due for the following services:     Medication Check    (FYI   Regarding office visits: In some instances, a video visit or telephone visit may be offered as an option.)      To schedule an appointment or if you have further questions, please contact your clinic at [phone].      Powered by [facility]    Sincerely,    Dat Huffman PA-C

## 2022-02-15 NOTE — TELEPHONE ENCOUNTER
Entered by Eleni Dominguez CMA on January 26, 2021 5:00:57 PM CST  Please advise patient overdue for depression med check since 12/2020. Received 30 day protocol refill and failed to schedule.      ------------------------------------------  From: [facility] #[id number]  To: Dat Huffman PA-C  Sent: January 26, 2021 3:35:58 AM CST  Subject: Medication Management  Due: January 22, 2021 9:58:08 AM CST     ** On Hold Pending Signature **     Dispensed Drug: PARoxetine (PARoxetine 30 mg oral tablet), TAKE 1 TABLET BY MOUTH DAILY  Quantity: 30 tab(s)  Days Supply: 30  Refills: 0  Substitutions Allowed  Notes from Pharmacy:  ---------------------------------------------------------------  From: Eleni Dominguez CMA (eRx Pool ([id number]_[facility]))   To: [facility] Message Pool ([id number]_[facility]);     Sent: 1/26/2021 5:01:04 PM CST  Subject: FW: Medication Management   Due Date/Time: 1/27/2021 3:35:00 AM CST---------------------  From: Brittanie Douglass MA ([facility] Message Pool ([id number]_[facility]))   To: Dat Huffman PA-C;     Sent: 1/30/2021 8:33:36 AM CST  Subject: FW: Medication Management   Due Date/Time: 1/27/2021 3:35:00 AM CST     Pt has appt w/ [facility] 2/3/2021 for f/u.---------------------  From: Dat Huffman PA-C   To: [facility] #[id number]    Sent: 1/31/2021 8:07:57 AM CST  Subject: FW: Medication Management     ** Submitted: **  Complete:PARoxetine (PARoxetine 30 mg oral tablet)   Signed by Dat Huffman PA-C  1/31/2021 2:07:00 PM [facility]    ** Approved **  PARoxetine (PAROXETINE 30MG TABLETS)  TAKE 1 TABLET BY MOUTH DAILY  Qty:  30 tab(s)        Days Supply:  30        Refills:  0          Substitutions Allowed     Route To Pharmacy - [facility] #[id number]

## 2022-02-15 NOTE — PROGRESS NOTES
Patient:   MARC TRUJILLO            MRN: [id number]            FIN: [id number]               Age:   54 years     Sex:  Male     :  1965   Associated Diagnoses:   Chronic kidney disease (CKD), stage III (moderate); Kidney stone   Author:   Malcolm Herrera MD      Visit Information      Date of Service: 2020 02:12 pm  Performing Location: [facility]  Encounter#: [id number]      Primary Care Provider (PCP):  Juli Galvan MD    NPI# [id number]      Referring Provider:  Malcolm Herrera MD    NPI# [id number]      Chief Complaint   2020 2:18 PM CST    Pt here today for consult. Discuss labs and kidney function. c/o some back pain.              Additional Information:No additional information recorded during visit.   Chief complaint and symptoms as noted above and confirmed with patient.  Recent lab and diagnostic studies reviewed with patient      History of Present Illness   2020: Marc was referred to me by K[facility] for evaluation of chronic kidney disease.  He suffered a symptomatic right-sided ureteral obstructing stone in May 2019 which did not pass spontaneously ultimately required procedural retrieval by urology.  He had labs obtained after that event in August demonstrating a creatinine elevation of 1.5.  Had recent repeat testing done this past month demonstrating stable though elevated creatinine of 1.5.  At time of obstructing kidney stone, SCr was as high as 2.3. He feels well.  Describes some intermittent right-sided flank pain.  No history of hematuria.  No family history of any kidney disease.  He takes no NSAIDs.  No history of any hypertension or fluid retention.         Review of Systems   Constitutional:  No fever, No chills.    Eye:  Negative except as documented in history of present illness.    Ear/Nose/Mouth/Throat:  Negative except as documented in history of present illness.    Respiratory:  No shortness of breath.    Cardiovascular:  No chest pain, No palpitations, No  peripheral edema, No syncope.    Gastrointestinal:  No nausea, No vomiting, No abdominal pain.    Genitourinary:  No dysuria, No hematuria.    Hematology/Lymphatics:  Negative except as documented in history of present illness.    Endocrine:  No excessive thirst, No polyuria.    Immunologic:  No recurrent fevers.    Musculoskeletal:  No joint pain, No muscle pain.    Neurologic:  Alert and oriented X4, No numbness, No tingling, No headache.       Health Status   Allergies:    Allergic Reactions (Selected)  Severity Not Documented  Aspirin Adult Low Strength (Stomach upset)  Celexa (Agitated)  Melatonin (Anxiety,nausea,hot flashes)   Medications:  (Selected)   Prescriptions  Prescribed  Paxil 30 mg oral tablet: = 1 tab(s) ( 30 mg ), PO, Daily, # 90 tab(s), 3 Refill(s), Type: Maintenance, Pharmacy: [id number], 1 tab(s) Oral daily  Viagra 100 mg oral tablet: 1 tab(s) ( 100 mg ), PO, daily, # 9 tab(s), 11 Refill(s), Type: Soft Stop, Pharmacy: [facility] #[id number], 1 tab(s) po daily,    Medications          *denotes recorded medication          Paxil 30 mg oral tablet: 30 mg, 1 tab(s), PO, Daily, 90 tab(s), 3 Refill(s).          Viagra 100 mg oral tablet: 100 mg, 1 tab(s), PO, daily, 9 tab(s), 11 Refill(s).       Problem list:    All Problems  BCC (basal cell carcinoma) / SNOMED CT 205383137 / Confirmed  Major depression in full remission / SNOMED CT 53008498 / Confirmed  Resolved: Moderate recurrent major depression / ICD-9-.32  Resolved: Vestibular schwannoma / SNOMED CT 491474285      Histories   Past Medical History:    Resolved  Moderate recurrent major depression (296.32):  Resolved.  Vestibular schwannoma (331005715):  Resolved.   Family History:    CA - Lung cancer  Father ()  Pancreatic cancer  Mother ()  CA - Cancer  Father ()     Procedure history:    Lithotripsy using laser (0452929917) on 2019 at 53 Years.  Colonoscopy (628568825) on 2016 at 50  Years.  Comments:  6/1/2016 8:18 AM CDT - Delon Patel MD  Indication: Screening  Sedation: Versed 3 mg, Fentanyl 150 mcg  Findings: Mild Sigmoid diverticulosis  Recommendation: Repeat in 10 years  Esophagogastroduodenoscopy (1825973117) on 1/28/2011 at 45 Years.  Removal of vestibular schwannoma (318654912) in 2008 at 43 Years.  Tonsillectomy and adenoidectomy (788440310).   Social History:        Alcohol Assessment            Beer (12 oz), 3 x's per week., 2 drinks/episode average.  3 drinks/episode maximum.      Tobacco Assessment: Denies Tobacco Use            Never smoker      Substance Abuse Assessment            Never      Employment and Education Assessment            Employed, Work/School description: .      Home and Environment Assessment            Marital status:  (Living together).  Spouse/Partner name: Kortney.  Lives with Spouse.  Living situation:               Home/Independent.      Nutrition and Health Assessment            Type of diet: Regular.      Exercise and Physical Activity Assessment: Does not exercise        Physical Examination   vital signs stable, as noted above   Vital Signs   1/14/2020 2:18 PM CST Peripheral Pulse Rate 67 bpm    HR Method Electronic    Systolic Blood Pressure 110 mmHg    Diastolic Blood Pressure 64 mmHg    Mean Arterial Pressure 79 mmHg    BP Site Right arm    BP Method Manual    Oxygen Saturation 99 %      Measurements from flowsheet : Measurements   1/14/2020 2:18 PM CST    Height Measured - Standard                69 in     General:  Alert and oriented, No acute distress.    Eye:  Extraocular movements are intact.    HENT:  Normocephalic, Oral mucosa is moist.    Neck:  Supple, No carotid bruit, No jugular venous distention, No lymphadenopathy.    Respiratory:  Lungs are clear to auscultation, Respirations are non-labored.    Cardiovascular:  Normal rate, No murmur, No edema.    Gastrointestinal:  Soft, Non-distended, Normal bowel sounds, No  organomegaly.    Musculoskeletal:  Normal range of motion, Normal strength, No tenderness.    Neurologic:  Alert, Oriented, Normal motor function, No focal deficits.    Cognition and Speech:  Oriented, Speech clear and coherent.    Psychiatric:  Appropriate mood & affect.       Review / Management   Results review:  Lab results   12/19/2019 8:14 AM CST Sodium Level 141 mmol/L    Potassium Level 4.5 mmol/L    Chloride Level 104 mmol/L    CO2 Level 28 mmol/L    Glucose Level 101 mg/dL  HI    BUN 23 mg/dL    Creatinine 1.51 mg/dL  HI    BUN/Creat Ratio 15    eGFR 52 mL/min/1.73m2  LOW    eGFR African American 60 mL/min/1.73m2    Calcium Level 10.0 mg/dL    Cholesterol 220 mg/dL  HI    Non-  HI    HDL 64 mg/dL    Chol/HDL Ratio 3.4      HI    Triglyceride 81 mg/dL   .       Impression and Plan   Diagnosis     Chronic kidney disease (CKD), stage III (moderate) (SAZ44-VX N18.3).     Kidney stone (IBA55-JW N20.0).       .) chronic kidney disease; stage 3a; baseline SCr 1.5 (eGFR ~50-55mL/min)  - progressive rise in SCr since '11 (SCr 1.2)  - recent rise in SCr to 2.3 in setting of unilateral obstructing kidney stone; interval improvement in SCr to 1.5  - CT A/P w/ contrast (5/2019): simple left renal cyst; right sided moderate hydro in setting of obstructing stone  - no clear attributable history to explain his CKD (no family hx, no NSAIDs, no vasoactive medications, no nephric/nephrotic features)  - will check UA, urine protein/Cr ratio, PTH, albumin, hemoglobin  - obtain renal US to rule out any residual obstruction (doubt any chronic obstruction from more recent kidney stone)  - if any features of active renal sediment, low threshold to pursue kidney biopsy    .) h/o kidney stone (5/2019); requiring instrumental retrieval  - stone analysis; mixed calcium oxalate and calcium phosphate  - h/o normal renal acidification; no known RTA    RTC in 2-4 weeks to review about results

## 2022-02-15 NOTE — TELEPHONE ENCOUNTER
---------------------  From: Juli Galvan MD   Sent: 5/22/2019 7:49:40 AM CDT  Subject: CT results     ** Submitted: **  Order:Return to Clinic (Request)  Details:  RFV: CT chest no contrast to follow up pulmonary nodules, Return in 1 year         Signed by Juli Galvan MD  5/22/2019 7:49:00 AM    Called patient with CT results. Called patient with CT results - benign appearance. Would like to repeat in one year.

## 2022-02-15 NOTE — PROGRESS NOTES
Chief Complaint    f/u skin exam--BCC to left ear and mid back.  noticed new lesions on right nostril, left cheek and forehead.  History of Present Illness      Patient is here for his skin exam follow up.  He had a couple of biopsies done from his left ear and middle of back, both came back as BCC.  Since his last visit, he noticed new skin lesions on his left cheek, forehead and right nostril.  Review of Systems           See HPI.  All other review of systems negative.              Physical Exam   Vitals & Measurements    T: 97.9   F (Tympanic)  HR: 72(Peripheral)  BP: 100/68     HT: 69 in  WT: 182.6 lb  BMI: 26.96           General:  Alert and oriented, No acute distress.            Musculoskeletal:  Normal gait.            Integumentary:  skin exam:  middle back--7 mm red scar with irregular appearing vessels; left ear--improved; left cheek--pink lesion c/w sebaceous hyperpasia; forehead--pink lesion c/w sebaceous hyperpasia; right nostril--1 mm pink nevus; right upper chest--pink nevus.          Psychiatric:  Cooperative, Appropriate mood & affect, Normal judgment.             Images     [Image Removed: 2019-12-12 17.53.25]2019-12-12 17.53.25    Middle of back  Assessment/Plan       1. BCC (basal cell carcinoma) (C44.91)        Possible recurrence with irregular vessel         Lesion was treated with 3 freeze/thaw cycles of liquid nitrogen.  RTC 6 months for follow up.               IBrittanie MA, acted solely as a scribe for, and in the presence of Dr. Smith Stovall who performed the services.             I, Smith Stovall MD, personally performed the services described in this documentation.  The documentation was scribed in my presence and is both accurate and complete.                Patient Information     Name:SALUD TRUJILLO TANNER      Address:       [address] [place] [id number]     Sex:Male     YOB: 1965     [phone]     Emergency Contact:RONNIE  MARILEE ALEXIS     MRN:[id number]     FIN:[id number]     Location[facility]     Date of Service:12/12/2019      Primary Care Physician:       Mandy MUSE, Juli, [phone]      Attending Physician:       Smith Stovall MD, [phone]  Problem List/Past Medical History    Ongoing     BCC (basal cell carcinoma)     Major depression in full remission    Historical     Moderate recurrent major depression     Vestibular schwannoma  Procedure/Surgical History     Lithotripsy using laser (05/17/2019)           Colonoscopy (06/01/2016)            Comments: Indication: Screening      Sedation: Versed 3 mg, Fentanyl 150 mcg      Findings: Mild Sigmoid diverticulosis      Recommendation: Repeat in 10 years.     Esophagogastroduodenoscopy (01/28/2011)           Removal of vestibular schwannoma (2008)           Tonsillectomy and adenoidectomy        Medications    Paxil 30 mg oral tablet, 30 mg= 1 tab(s), Oral, daily    Viagra 100 mg oral tablet, 100 mg= 1 tab(s), Oral, daily, 11 refills  Allergies    Aspirin Adult Low Strength (Stomach upset)    Celexa (Agitated)    melatonin (Anxiety,nausea,hot flashes)  Social History    Smoking Status - 12/12/2019     Never smoker     Alcohol      Beer (12 oz), 3 x's per week., 2 drinks/episode average. 3 drinks/episode maximum., 04/07/2016     Employment/School      Employed, Work/School description: ., 04/07/2016     Exercise - Does not exercise, 04/07/2016     Home/Environment      Marital status:  (Living together). Spouse/Partner name: Marilee. Lives with Spouse. Living situation: Home/Independent., 04/07/2016     Nutrition/Health      Type of diet: Regular., 04/07/2016     Substance Abuse      Never, 04/07/2016     Tobacco - Denies Tobacco Use, 04/19/2010      Never smoker, 04/24/2017  Family History    CA - Cancer: Father.    CA - Lung cancer: Father.    Pancreatic cancer: Mother.  Immunizations      Vaccine Date Status      tetanus/diphth/pertuss  (Tdap) adult/adol 03/10/2015 Given      Td 06/09/2005 Recorded

## 2022-02-15 NOTE — LETTER
(Inserted Image. Unable to display)   February 05, 2020        SALUD TRUJILLO   [address], [place] [id number]        Dear SALUD,      Thank you for selecting [facility] for your healthcare needs.    Our records indicate you are due for the following services:     Follow-up office visit     To schedule an appointment or if you have further questions, please contact your primary clinic:   [facility]       [phone]   [facility]       [phone]              [facility]     [phone]      Powered by [employer]    Sincerely,    Malcolm Herrera M.D.

## 2022-02-15 NOTE — NURSING NOTE
CAGE Assessment Entered On:  2/3/2021 4:50 PM CST    Performed On:  2/3/2021 4:50 PM CST by Lisbet Rubio CMA               Assessment   Have you ever felt you should cut down on your drinking :   No   Have people annoyed you by criticizing your drinking :   No   Have you ever felt bad or guilty about your drinking :   No   Have you ever taken a drink first thing in the morning to steady your nerves or get rid of a hangover (Eye-opener) :   No   CAGE Score :   0    Lisbet Rubio CMA - 2/3/2021 4:50 PM CST

## 2022-02-15 NOTE — NURSING NOTE
Comprehensive Intake Entered On:  2/3/2021 3:25 PM CST    Performed On:  2/3/2021 3:20 PM CST by Lisbet Rubio CMA               Summary   Chief Complaint :   Depression f/u and med refills.    Menstrual Status :   N/A   Weight Measured :   192 lb(Converted to: 192 lb 0 oz, 87.090 kg)    Height Measured :   69 in(Converted to: 5 ft 9 in, 175.26 cm)    Body Mass Index :   28.35 kg/m2 (HI)    Body Surface Area :   2.06 m2   Systolic Blood Pressure :   104 mmHg   Diastolic Blood Pressure :   70 mmHg   Mean Arterial Pressure :   81 mmHg   Peripheral Pulse Rate :   85 bpm   BP Site :   Right arm   BP Method :   Manual   Temperature Tympanic :   97.9 DegF(Converted to: 36.6 DegC)    Oxygen Saturation :   96 %   Lisbet Rubio CMA - 2/3/2021 3:20 PM CST   Health Status   Allergies Verified? :   Yes   Medication History Verified? :   Yes   Immunizations Current :   Unknown   Pre-Visit Planning Status :   Completed   Tobacco Use? :   Never smoker   Lisbet Rubio CMA - 2/3/2021 3:20 PM CST   Consents   Consent for Immunization Exchange :   Consent Granted   Consent for Immunizations to Providers :   Consent Granted   Lisbet Rubio CMA - 2/3/2021 3:20 PM CST   Meds / Allergies   (As Of: 2/3/2021 3:25:28 PM CST)   Allergies (Active)   Aspirin Adult Low Strength  Estimated Onset Date:   Unspecified ; Reactions:   Stomach upset ; Created By:   Claudia Barrera LPN; Reaction Status:   Active ; Category:   Drug ; Substance:   Aspirin Adult Low Strength ; Type:   Allergy ; Updated By:   Claudia Barrera LPN; Reviewed Date:   1/14/2020 2:19 PM CST      Celexa  Estimated Onset Date:   Unspecified ; Reactions:   Agitated ; Created By:   America Montelongo; Reaction Status:   Active ; Category:   Drug ; Substance:   Celexa ; Type:   Allergy ; Updated By:   America Montelongo; Source:   Paper Chart ; Reviewed Date:   1/14/2020 2:19 PM CST      melatonin  Estimated Onset Date:   Unspecified ; Reactions:   Anxiety,nausea,hot flashes ; Created By:    Claudia Barrera LPN; Reaction Status:   Active ; Category:   Drug ; Substance:   melatonin ; Type:   Allergy ; Updated By:   Claudia Barrera LPN; Reviewed Date:   1/14/2020 2:19 PM CST        Medication List   (As Of: 2/3/2021 3:25:28 PM CST)   Prescription/Discharge Order    PARoxetine  :   PARoxetine ; Status:   Prescribed ; Ordered As Mnemonic:   PARoxetine 30 mg oral tablet ; Simple Display Line:   1 tab(s), Oral, daily, 30 tab(s), 0 Refill(s) ; Ordering Provider:   Dat Huffman PA-C; Catalog Code:   PARoxetine ; Order Dt/Tm:   1/31/2021 8:07:44 AM CST          sildenafil  :   sildenafil ; Status:   Prescribed ; Ordered As Mnemonic:   Viagra 100 mg oral tablet ; Simple Display Line:   100 mg, 1 tab(s), PO, daily, 9 tab(s), 11 Refill(s) ; Ordering Provider:   Juli Galvan MD; Catalog Code:   sildenafil ; Order Dt/Tm:   4/7/2016 9:12:20 AM CDT            ID Risk Screen   Recent Travel History :   No recent travel   Family Member Travel History :   No recent travel   Other Exposure to Infectious Disease :   Unknown   COVID-19 Testing Status :   No COVID-19 test performed   Lisbet Rubio CMA - 2/3/2021 3:20 PM CST   Social History   Social History   (As Of: 2/3/2021 3:25:28 PM CST)   Alcohol:        Beer (12 oz), 3 x's per week., 2 drinks/episode average.  3 drinks/episode maximum.   (Last Updated: 4/7/2016 11:39:19 AM CDT by Claudia Barrera LPN)          Tobacco:  Denies Tobacco Use      Never (less than 100 in lifetime)   (Last Updated: 2/3/2021 3:22:15 PM CST by Lisbet Rubio CMA)          Electronic Cigarette/Vaping:  Denies Electronic Cigarette Use      Electronic Cigarette Use: Never.   (Last Updated: 2/3/2021 3:22:21 PM CST by Lisbet Rubio CMA)          Substance Abuse:        Never   (Last Updated: 4/7/2016 11:40:50 AM CDT by Claudia Barrera LPN)          Employment/School:        Employed, Work/School description: .   (Last Updated: 4/7/2016 11:39:50 AM CDT by Claudia Barrera LPN)           Home/Environment:        Marital status:  (Living together).  Spouse/Partner name: Kortney.  Lives with Spouse.  Living situation: Home/Independent.   (Last Updated: 4/7/2016 11:40:07 AM CDT by Claudia Barrera LPN)          Nutrition/Health:        Type of diet: Regular.   (Last Updated: 4/7/2016 11:40:23 AM CDT by Claudia Barrera LPN)          Exercise:  Does not exercise      (Last Updated: 4/7/2016 11:40:34 AM CDT by Claudia Barrera LPN )

## 2022-02-15 NOTE — NURSING NOTE
Comprehensive Intake Entered On:  1/14/2020 2:19 PM CST    Performed On:  1/14/2020 2:18 PM CST by Jose F Antony               Summary   Chief Complaint :   Pt here today for consult. Discuss labs and kidney function. c/o some back pain.    Menstrual Status :   N/A   Height Measured :   69 in(Converted to: 5 ft 9 in, 175.26 cm)    Systolic Blood Pressure :   110 mmHg   Diastolic Blood Pressure :   64 mmHg   Mean Arterial Pressure :   79 mmHg   Peripheral Pulse Rate :   67 bpm   BP Site :   Right arm   BP Method :   Manual   HR Method :   Electronic   Oxygen Saturation :   99 %   Jose F Antony - 1/14/2020 2:18 PM CST   Health Status   Allergies Verified? :   Yes   Medication History Verified? :   Yes   Immunizations Current :   Unknown   Medical History Verified? :   Yes   Pre-Visit Planning Status :   Completed   Jose F Antony - 1/14/2020 2:18 PM CST   Consents   Consent for Immunization Exchange :   Consent Granted   Consent for Immunizations to Providers :   Consent Granted   Jose F Antony - 1/14/2020 2:18 PM CST   Problems   (As Of: 1/14/2020 2:19:53 PM CST)   Problems(Active)    BCC (basal cell carcinoma) (SNOMED CT  :072849905 )  Name of Problem:   BCC (basal cell carcinoma) ; Recorder:   Brittanie Douglass MA; Confirmation:   Confirmed ; Classification:   Medical ; Code:   974699553 ; Contributor System:   [employer]enable ; Last Updated:   12/12/2019 5:52 PM CST ; Life Cycle Date:   12/12/2019 ; Life Cycle Status:   Active ; Responsible Provider:   Brittanie Douglass MA; Vocabulary:   SNOMED CT        Major depression in full remission (SNOMED CT  :58656331 )  Name of Problem:   Major depression in full remission ; Recorder:   Juli Galvan MD; Confirmation:   Confirmed ; Classification:   Medical ; Code:   [id number] ; Contributor System:   PowerChart ; Last Updated:   10/29/2018 3:58 PM CDT ; Life Cycle Date:   10/29/2018 ; Life Cycle Status:   Active ; Responsible  Provider:   Juli Galvan MD; Vocabulary:   SNOMED CT          Meds / Allergies   (As Of: 1/14/2020 2:19:53 PM CST)   Allergies (Active)   Aspirin Adult Low Strength  Estimated Onset Date:   Unspecified ; Reactions:   Stomach upset ; Created By:   Claudia Barrera LPN; Reaction Status:   Active ; Category:   Drug ; Substance:   Aspirin Adult Low Strength ; Type:   Allergy ; Updated By:   Claudia Barrera LPN; Reviewed Date:   1/14/2020 2:19 PM CST      Celexa  Estimated Onset Date:   Unspecified ; Reactions:   Agitated ; Created By:   America Montelongo; Reaction Status:   Active ; Category:   Drug ; Substance:   Celexa ; Type:   Allergy ; Updated By:   America Montelongo; Source:   Paper Chart ; Reviewed Date:   1/14/2020 2:19 PM CST      melatonin  Estimated Onset Date:   Unspecified ; Reactions:   Anxiety,nausea,hot flashes ; Created By:   Claudia Barrera LPN; Reaction Status:   Active ; Category:   Drug ; Substance:   melatonin ; Type:   Allergy ; Updated By:   Claudia Barrera LPN; Reviewed Date:   1/14/2020 2:19 PM CST        Medication List   (As Of: 1/14/2020 2:19:53 PM CST)   Prescription/Discharge Order    PARoxetine  :   PARoxetine ; Status:   Prescribed ; Ordered As Mnemonic:   Paxil 30 mg oral tablet ; Simple Display Line:   30 mg, 1 tab(s), PO, Daily, 90 tab(s), 3 Refill(s) ; Ordering Provider:   Dat Huffman PA-C; Catalog Code:   PARoxetine ; Order Dt/Tm:   12/26/2019 8:09:33 AM CST          sildenafil  :   sildenafil ; Status:   Prescribed ; Ordered As Mnemonic:   Viagra 100 mg oral tablet ; Simple Display Line:   100 mg, 1 tab(s), PO, daily, 9 tab(s), 11 Refill(s) ; Ordering Provider:   Juli Galvan MD; Catalog Code:   sildenafil ; Order Dt/Tm:   4/7/2016 9:12:20 AM CDT

## 2022-02-15 NOTE — PROGRESS NOTES
Patient:   MARC TRUJILLO            MRN: [id number]            FIN: [id number]               Age:   54 years     Sex:  Male     :  1965   Associated Diagnoses:   Chronic kidney disease (CKD), stage III (moderate); Kidney stone   Author:   Malcolm Herrera MD      Visit Information      Date of Service: 03/10/2020 03:07 pm  Performing Location: [facility]  Encounter#: [id number]      Primary Care Provider (PCP):  Juli Galvan MD    NPI# [id number]      Referring Provider:  Malcolm Herrera MD    NPI# [id number]      Chief Complaint   3/10/2020 3:14 PM CDT    f/u CKD results              Additional Information:No additional information recorded during visit.   Chief complaint and symptoms as noted above and confirmed with patient.  Recent lab and diagnostic studies reviewed with patient      History of Present Illness   2020: Marc was referred to me by [facility] for evaluation of chronic kidney disease.  He suffered a symptomatic right-sided ureteral obstructing stone in May 2019 which did not pass spontaneously ultimately required procedural retrieval by urology.  He had labs obtained after that event in August demonstrating a creatinine elevation of 1.5.  Had recent repeat testing done this past month demonstrating stable though elevated creatinine of 1.5.  At time of obstructing kidney stone, SCr was as high as 2.3. He feels well.  Describes some intermittent right-sided flank pain.  No history of hematuria.  No family history of any kidney disease.  He takes no NSAIDs.  No history of any hypertension or fluid retention.    3/10/2020: Marc returns for follow-up related to his chronic kidney disease.  No interval change in health since our last visit.         Review of Systems   Constitutional:  No fever, No chills.    Eye:  Negative except as documented in history of present illness.    Ear/Nose/Mouth/Throat:  Negative except as documented in history of present illness.    Respiratory:  No shortness  of breath.    Cardiovascular:  No chest pain, No palpitations, No peripheral edema, No syncope.    Gastrointestinal:  No nausea, No vomiting, No abdominal pain.    Genitourinary:  No dysuria, No hematuria.    Hematology/Lymphatics:  Negative except as documented in history of present illness.    Endocrine:  No excessive thirst, No polyuria.    Immunologic:  No recurrent fevers.    Musculoskeletal:  No joint pain, No muscle pain.    Neurologic:  Alert and oriented X4, No numbness, No tingling, No headache.       Health Status   Allergies:    Allergic Reactions (Selected)  Severity Not Documented  Aspirin Adult Low Strength (Stomach upset)  Celexa (Agitated)  Melatonin (Anxiety,nausea,hot flashes)   Medications:  (Selected)   Prescriptions  Prescribed  Paxil 30 mg oral tablet: = 1 tab(s) ( 30 mg ), PO, Daily, # 90 tab(s), 3 Refill(s), Type: Maintenance, [id number], 1 tab(s) Oral daily  Viagra 100 mg oral tablet: 1 tab(s) ( 100 mg ), PO, daily, # 9 tab(s), 11 Refill(s), Type: Soft Stop, Pharmacy: [facility] #[id number], 1 tab(s) po daily,    Medications          *denotes recorded medication          Paxil 30 mg oral tablet: 30 mg, 1 tab(s), PO, Daily, 90 tab(s), 3 Refill(s).          Viagra 100 mg oral tablet: 100 mg, 1 tab(s), PO, daily, 9 tab(s), 11 Refill(s).       Problem list:    All Problems  BCC (basal cell carcinoma) / SNOMED CT 014188056 / Confirmed  Major depression in full remission / SNOMED CT 27253046 / Confirmed  Resolved: Moderate recurrent major depression / ICD-9-.32  Resolved: Vestibular schwannoma / SNOMED CT 619360468      Histories   Past Medical History:    Resolved  Moderate recurrent major depression (296.32):  Resolved.  Vestibular schwannoma (577552914):  Resolved.   Family History:    CA - Lung cancer  Father ()  Pancreatic cancer  Mother ()  CA - Cancer  Father ()     Procedure history:    Lithotripsy using laser (4174152771) on  5/17/2019 at 53 Years.  Colonoscopy (551000699) on 6/1/2016 at 50 Years.  Comments:  6/1/2016 8:18 AM CDT - Delon Patel MD  Indication: Screening  Sedation: Versed 3 mg, Fentanyl 150 mcg  Findings: Mild Sigmoid diverticulosis  Recommendation: Repeat in 10 years  Esophagogastroduodenoscopy (6901194525) on 1/28/2011 at 45 Years.  Removal of vestibular schwannoma (042355167) in 2008 at 43 Years.  Tonsillectomy and adenoidectomy (975564482).   Social History:        Alcohol Assessment            Beer (12 oz), 3 x's per week., 2 drinks/episode average.  3 drinks/episode maximum.      Tobacco Assessment: Denies Tobacco Use            Never smoker      Substance Abuse Assessment            Never      Employment and Education Assessment            Employed, Work/School description: .      Home and Environment Assessment            Marital status:  (Living together).  Spouse/Partner name: Kortney.  Lives with Spouse.  Living situation:               Home/Independent.      Nutrition and Health Assessment            Type of diet: Regular.      Exercise and Physical Activity Assessment: Does not exercise        Physical Examination   vital signs stable, as noted above   Vital Signs   3/10/2020 3:14 PM CDT Temperature Tympanic 97 DegF  LOW    Peripheral Pulse Rate 64 bpm    Systolic Blood Pressure 116 mmHg    Diastolic Blood Pressure 70 mmHg    Mean Arterial Pressure 85 mmHg      Measurements from flowsheet : Measurements   3/10/2020 3:14 PM CDT Height Measured - Standard 69 in    Weight Measured - Standard 189 lb    BSA 2.04 m2    Body Mass Index 27.91 kg/m2  HI      General:  Alert and oriented, No acute distress.    Neck:  Supple.    Respiratory:  Lungs are clear to auscultation, Respirations are non-labored.    Cardiovascular:  Normal rate, Regular rhythm, No murmur, No edema.    Gastrointestinal:  Soft, Non-tender, Non-distended.    Genitourinary:  No costovertebral angle tenderness.    Musculoskeletal:   Normal range of motion, Normal strength, No tenderness.    Neurologic:  Alert, Oriented.    Cognition and Speech:  Oriented, Speech clear and coherent.    Psychiatric:  Appropriate mood & affect.       Review / Management   Results review:  Lab results   1/14/2020 3:02 PM CST Sodium Level 138 mmol/L    Potassium Level 4.3 mmol/L    Chloride Level 103 mmol/L    CO2 Level 28 mmol/L    Glucose Level 84 mg/dL    BUN 30 mg/dL  HI    Creatinine 1.44 mg/dL  HI    BUN/Creat Ratio 21    eGFR 55 mL/min/1.73m2  LOW    eGFR African American 63 mL/min/1.73m2    Calcium Level 9.5 mg/dL    PTH Intact 43 pg/mL    Albumin Level 4.4 gm/dL    U Protein 15 mg/dL    U Protein/Creatinine Ratio 114    U Protein/Creatinine Ratio 0.114    Ur Creatinine 132 mg/dL    Hgb 15.3 gm/dL    UA Color YELLOW    UA Appear CLEAR    UA pH 6.0    UA Specific Gravity 1.022    UA Glucose NEGATIVE    UA Bilirubin NEGATIVE    UA Ketones NEGATIVE    UA Blood NEGATIVE    UA Protein NEGATIVE    UA Nitrite NEGATIVE    UA Leuk Est NEGATIVE    UA Squam Epithelial NONE SEEN /HPF    UA Hyaline Cast NONE SEEN /LPF    UA WBC NONE SEEN /HPF    UA RBC NONE SEEN /HPF    UA CA Ox Crystal FEW /HPF    UA Bacteria NONE SEEN /HPF   12/19/2019 8:14 AM CST Sodium Level 141 mmol/L    Potassium Level 4.5 mmol/L    Chloride Level 104 mmol/L    CO2 Level 28 mmol/L    Glucose Level 101 mg/dL  HI    BUN 23 mg/dL    Creatinine 1.51 mg/dL  HI    BUN/Creat Ratio 15    eGFR 52 mL/min/1.73m2  LOW    eGFR African American 60 mL/min/1.73m2    Calcium Level 10.0 mg/dL    Cholesterol 220 mg/dL  HI    Non-  HI    HDL 64 mg/dL    Chol/HDL Ratio 3.4      HI    Triglyceride 81 mg/dL   .       Impression and Plan   Diagnosis     Chronic kidney disease (CKD), stage III (moderate) (DDF25-PK N18.3).     Kidney stone (TOH79-VE N20.0).       .) chronic kidney disease; stage 3a; baseline SCr 1.5 (eGFR ~50-55mL/min)  - progressive rise in SCr since '11 (SCr 1.2)  - recent rise in SCr to 2.3  in setting of unilateral obstructing kidney stone; interval improvement in SCr to 1.5  - CT A/P w/ contrast (5/2019): simple left renal cyst; right sided moderate hydro in setting of obstructing stone   - subsequent renal US (1/2020): resolution of hydro  - no clear attributable history to explain his CKD (no family hx, no NSAIDs, no vasoactive medications, no nephric/nephrotic features)  - 1/2020 work-up: normal UA, urine protein/Cr ratio, PTH, albumin, hemoglobin  - given no active renal sediment, nor proteinuria - no pursuits for renal biopsy    .) h/o kidney stone (5/2019); requiring instrumental retrieval  - stone analysis; mixed calcium oxalate and calcium phosphate  - h/o normal renal acidification; no known RTA  - talked about general stone prevention strategies including daily urine volume goal of 2-2.5L/day, general reduction in dietary sodium and animal protein     RTC annually for CKD f/u

## 2022-02-15 NOTE — PROGRESS NOTES
Patient:   SALUD TRUJILLO            MRN: [id number]            FIN: [id number]               Age:   54 years     Sex:  Male     :  1965   Associated Diagnoses:   Major depression in full remission   Author:   Dat Huffman PA-C      Report Summary   Diagnosis  Major depression in full remission (HJJ35-KR F32.5).  Patient InstructionsOrders   Visit Information      Date of Service: 2019 07:50 am  Performing Location: [facility]  Encounter#: [id number]      Primary Care Provider (PCP):  Juli Galvan MD    NPI# [id number]      Referring Provider:  Dat Huffman PA-C    NPI# [id number]      Chief Complaint   2019 8:01 AM CST   Paxil med check; things are going good        History of Present Illness   Chief complaint reviewed and confirmed with patient.    Patient is here to refill medication - paroxetine.  Tolerating the medication well and wants to continue on current dose.  Overall, reports his mood has been good and depression symptoms have been stable.      Not interfering with his daily activities or work.    Patient reports no other concerns or questions to address today.      Review of Systems   Psychiatric:  Negative.       Health Status   Allergies:    Allergic Reactions (Selected)  Severity Not Documented  Aspirin Adult Low Strength (Stomach upset)  Celexa (Agitated)  Melatonin (Anxiety,nausea,hot flashes)   Medications:  (Selected)   Prescriptions  Prescribed  Paxil 30 mg oral tablet: = 1 tab(s) ( 30 mg ), PO, Daily, # 90 tab(s), 3 Refill(s), Type: Maintenance, [id number], 1 tab(s) Oral daily  Viagra 100 mg oral tablet: 1 tab(s) ( 100 mg ), PO, daily, # 9 tab(s), 11 Refill(s), Type: Soft Stop, Pharmacy: [facility] #[id number], 1 tab(s) po daily,    Medications          *denotes recorded medication          Paxil 30 mg oral tablet: 30 mg, 1 tab(s), PO, Daily, 90 tab(s), 3 Refill(s).          Viagra 100 mg oral tablet: 100 mg, 1 tab(s), PO, daily,  9 tab(s), 11 Refill(s).       Problem list:    All Problems  Major depression in full remission / SNOMED CT 32491855 / Confirmed  BCC (basal cell carcinoma) / SNOMED CT 983717714 / Confirmed      Histories   Past Medical History:    Resolved  Moderate recurrent major depression (296.32):  Resolved.  Vestibular schwannoma (116465102):  Resolved.   Family History:    CA - Lung cancer  Father ()  Pancreatic cancer  Mother ()  CA - Cancer  Father ()     Procedure history:    Lithotripsy using laser (4291320995) on 2019 at 53 Years.  Colonoscopy (480323822) on 2016 at 50 Years.  Comments:  2016 8:18 AM CDT - Delon Patel MD  Indication: Screening  Sedation: Versed 3 mg, Fentanyl 150 mcg  Findings: Mild Sigmoid diverticulosis  Recommendation: Repeat in 10 years  Esophagogastroduodenoscopy (9950409715) on 2011 at 45 Years.  Removal of vestibular schwannoma (744354535) in  at 43 Years.  Tonsillectomy and adenoidectomy (350154106).   Social History:        Alcohol Assessment            Beer (12 oz), 3 x's per week., 2 drinks/episode average.  3 drinks/episode maximum.      Tobacco Assessment: Denies Tobacco Use            Never smoker      Substance Abuse Assessment            Never      Employment and Education Assessment            Employed, Work/School description: .      Home and Environment Assessment            Marital status:  (Living together).  Spouse/Partner name: Kortney.  Lives with Spouse.  Living situation:               Home/Independent.      Nutrition and Health Assessment            Type of diet: Regular.      Exercise and Physical Activity Assessment: Does not exercise        Physical Examination   Vital Signs   2019 8:01 AM CST Peripheral Pulse Rate 75 bpm    Systolic Blood Pressure 108 mmHg    Diastolic Blood Pressure 70 mmHg    Mean Arterial Pressure 83 mmHg    Oxygen Saturation 97 %      Measurements from flowsheet : Measurements    12/26/2019 8:01 AM CST Height Measured - Standard 69 in    Weight Measured - Standard 186.0 lb    BSA 2.02 m2    Body Mass Index 27.46 kg/m2  HI      General:  Alert and oriented, No acute distress.    Respiratory:  Lungs are clear to auscultation, Respirations are non-labored.    Cardiovascular:  Normal rate, Regular rhythm.    Psychiatric:  Cooperative, Appropriate mood & affect, Non-suicidal, No Abnormal / Psychotic thoughts.         Behavior: Relaxed.         Thought process: Appropriate.       Health Maintenance      Recommendations     Pending (in the next year)        OverDue           Influenza Vaccine due  09/01/19  and every 1  year(s)           Depression Screen (Male) due  10/29/19  and every 1  year(s)           Alcohol Misuse Screen (Male) due  10/30/19  and every 1  year(s)        Due            Aspirin Therapy for Prevention of CVD (Male) due  12/26/19  and every 5  year(s)           Hepatitis C Screen 7539-1052 (Male) due  12/26/19  One-time only        Due In Future            Lipid Disorders Screen (Male) not due until  12/19/20  and every 1  year(s)     Satisfied (in the past 1 year)        Satisfied            Body Mass Index Check (Male) on  12/26/19.           Body Mass Index Check (Male) on  12/12/19.           Body Mass Index Check (Male) on  09/16/19.           Body Mass Index Check (Male) on  05/16/19.           High Blood Pressure Screen (Male) on  12/26/19.           High Blood Pressure Screen (Male) on  12/12/19.           High Blood Pressure Screen (Male) on  09/16/19.           High Blood Pressure Screen (Male) on  05/16/19.           Lipid Disorders Screen (Male) on  12/19/19.           Lipid Disorders Screen (Male) on  12/19/19.           Lipid Disorders Screen (Male) on  12/19/19.           Lipid Disorders Screen (Male) on  12/19/19.           Lipid Disorders Screen (Male) on  08/15/19.           Lipid Disorders Screen (Male) on  08/15/19.           Lipid Disorders Screen (Male)  on  08/15/19.           Lipid Disorders Screen (Male) on  08/15/19.           Tobacco Use Screen (Male) on  12/26/19.           Tobacco Use Screen (Male) on  12/12/19.           Tobacco Use Screen (Male) on  09/16/19.           Tobacco Use Screen (Male) on  05/16/19.          Impression and Plan   Diagnosis     Major depression in full remission (GYB02-JA F32.5).     Patient Instructions:       Counseled: Patient, Regarding diagnosis, Regarding treatment, Regarding medications, Activity, Verbalized understanding.    Orders     Orders (Selected)   Prescriptions  Prescribed  Paxil 30 mg oral tablet: = 1 tab(s) ( 30 mg ), PO, Daily, # 90 tab(s), 3 Refill(s), Type: Maintenance, [facility]: [id number], 1 tab(s) Oral daily.        Professional Services     RTC in one year and PRN.

## 2022-02-15 NOTE — TELEPHONE ENCOUNTER
Entered by Brittanie Douglass MA on December 21, 2020 12:40:55 PM CST  ---------------------  From: Brittanie Douglass MA   To: [facility] #[id number]    Sent: 12/21/2020 12:40:55 PM CST  Subject: Medication Management     ** Submitted: **  Order:PARoxetine (PARoxetine 30 mg oral tablet)  1 tab(s)  Oral  daily  Qty:  30 tab(s)        Refills:  0          Substitutions Allowed     Route To [facility] #[id number]    Signed by Brittanie Douglass MA  12/21/2020 6:40:00 PM [facility]    ** Submitted: **  Complete:PARoxetine (Paxil 30 mg oral tablet)   Signed by Brittanie Douglass MA  12/21/2020 6:40:00 PM [facility]    ** Not Approved:  **  PARoxetine (PAROXETINE 30MG TABLETS)  TAKE 1 TABLET BY MOUTH DAILY  Qty:  90 tab(s)        Days Supply:  90        Refills:  0          Substitutions Allowed     Route To [facility] #[id number]   Signed by Brittanie Douglass MA            ------------------------------------------  From: [facility] #[id number]  To: Dat Huffman PA-C  Sent: December 19, 2020 3:30:19 AM CST  Subject: Medication Management  Due: December 16, 2020 8:25:29 PM CST     ** On Hold Pending Signature **     Dispensed Drug: PARoxetine (PARoxetine 30 mg oral tablet), TAKE 1 TABLET BY MOUTH DAILY  Quantity: 90 tab(s)  Days Supply: 90  Refills: 0  Substitutions Allowed  Notes from Pharmacy:  ------------------------------------------Med Refill      Date of last office visit and reason:  6/9/2020 w/ GTG for skin check      Date of last Med Check / Px:   12/26/196 w/ [facility] for depression f/u  Date of last labs pertaining to med:  _    Note:  _    RTC order in chart:  RTC due     For Protocol refill, has patient been contacted:  message sent to pharmacy

## 2022-02-15 NOTE — NURSING NOTE
Comprehensive Intake Entered On:  9/16/2019 5:12 PM CDT    Performed On:  9/16/2019 5:07 PM CDT by Evonne CHAVEZ, Brittanie               Summary   Chief Complaint :   here for skin check.  would like left ear looked at--has recurring scabbed lesion, has tried cryocautery.   Menstrual Status :   N/A   Weight Measured :   184.2 lb(Converted to: 184 lb 3 oz, 83.55 kg)    Height Measured :   69 in(Converted to: 5 ft 9 in, 175.26 cm)    Body Mass Index :   27.2 kg/m2 (HI)    Body Surface Area :   2.01 m2   Systolic Blood Pressure :   102 mmHg   Diastolic Blood Pressure :   80 mmHg   Mean Arterial Pressure :   87 mmHg   Peripheral Pulse Rate :   72 bpm   BP Site :   Right arm   Pulse Site :   Radial artery   BP Method :   Manual   HR Method :   Manual   Temperature Tympanic :   98.3 DegF(Converted to: 36.8 DegC)    Brittanie Douglass MA - 9/16/2019 5:07 PM CDT   Health Status   Allergies Verified? :   Yes   Medication History Verified? :   Yes   Immunizations Current :   Unknown   Medical History Verified? :   Yes   Pre-Visit Planning Status :   Completed   Tobacco Use? :   Never smoker   Brittanie Douglass MA - 9/16/2019 5:07 PM CDT   Consents   Consent for Immunization Exchange :   Consent Granted   Consent for Immunizations to Providers :   Consent Granted   Brittanie Douglass MA - 9/16/2019 5:07 PM CDT   Meds / Allergies   (As Of: 9/16/2019 5:12:50 PM CDT)   Allergies (Active)   Aspirin Adult Low Strength  Estimated Onset Date:   Unspecified ; Reactions:   Stomach upset ; Created By:   Claudia Barrera LPN; Reaction Status:   Active ; Category:   Drug ; Substance:   Aspirin Adult Low Strength ; Type:   Allergy ; Updated By:   Claudia Barrera LPN; Reviewed Date:   5/16/2019 9:24 AM CDT      Celexa  Estimated Onset Date:   Unspecified ; Reactions:   Agitated ; Created By:   America Montelongo; Reaction Status:   Active ; Category:   Drug ; Substance:   Celexa ; Type:   Allergy ; Updated By:   America Montelongo; Source:   Paper Chart ;  Reviewed Date:   5/16/2019 9:24 AM CDT      melatonin  Estimated Onset Date:   Unspecified ; Reactions:   Anxiety,nausea,hot flashes ; Created By:   Claudia Barrera LPN; Reaction Status:   Active ; Category:   Drug ; Substance:   melatonin ; Type:   Allergy ; Updated By:   Claudia Barrera LPN; Reviewed Date:   5/16/2019 9:24 AM CDT        Medication List   (As Of: 9/16/2019 5:12:50 PM CDT)   Prescription/Discharge Order    PARoxetine  :   PARoxetine ; Status:   Prescribed ; Ordered As Mnemonic:   Paxil 30 mg oral tablet ; Simple Display Line:   30 mg, 1 tab(s), PO, Daily, 90 tab(s), 0 Refill(s) ; Ordering Provider:   Juli Galvan MD; Catalog Code:   PARoxetine ; Order Dt/Tm:   8/13/2019 10:47:52 AM          sildenafil  :   sildenafil ; Status:   Prescribed ; Ordered As Mnemonic:   Viagra 100 mg oral tablet ; Simple Display Line:   100 mg, 1 tab(s), PO, daily, 9 tab(s), 11 Refill(s) ; Ordering Provider:   Juli Galvan MD; Catalog Code:   sildenafil ; Order Dt/Tm:   4/7/2016 9:12:20 AM            Home Meds    acetaminophen-hydrocodone  :   acetaminophen-hydrocodone ; Status:   Completed ; Ordered As Mnemonic:   Norco 5 mg-325 mg oral tablet ; Simple Display Line:   1 tab(s), PO, q4hr, PRN: for pain, 0 Refill(s) ; Catalog Code:   acetaminophen-hydrocodone ; Order Dt/Tm:   5/16/2019 9:24:44 AM          tamsulosin  :   tamsulosin ; Status:   Completed ; Ordered As Mnemonic:   Flomax 0.4 mg oral capsule ; Simple Display Line:   0.4 mg, 1 cap(s), Oral, daily, 0 Refill(s) ; Catalog Code:   tamsulosin ; Order Dt/Tm:   5/16/2019 9:24:26 AM            Social History   Social History   (As Of: 9/16/2019 5:12:50 PM CDT)   Alcohol:        Beer (12 oz), 3 x's per week., 2 drinks/episode average.  3 drinks/episode maximum.   (Last Updated: 4/7/2016 11:39:19 AM CDT by Claudia Barrera LPN)          Tobacco:  Denies Tobacco Use      Never smoker   (Last Updated: 4/24/2017 4:27:05 PM CDT by Claudia Barrera LPN)           Substance Abuse:        Never   (Last Updated: 4/7/2016 11:40:50 AM CDT by Claudia Barrera LPN)          Employment/School:        Employed, Work/School description: .   (Last Updated: 4/7/2016 11:39:50 AM CDT by Claudia Barrera LPN)          Home/Environment:        Marital status:  (Living together).  Spouse/Partner name: Kortney.  Lives with Spouse.  Living situation: Home/Independent.   (Last Updated: 4/7/2016 11:40:07 AM CDT by Claudia Barrera LPN)          Nutrition/Health:        Type of diet: Regular.   (Last Updated: 4/7/2016 11:40:23 AM CDT by Claudia Barrera LPN)          Exercise:  Does not exercise      (Last Updated: 4/7/2016 11:40:34 AM CDT by Claudia Barrera LPN )

## 2022-02-15 NOTE — NURSING NOTE
Comprehensive Intake Entered On:  4/14/2021 3:16 PM CDT    Performed On:  4/14/2021 3:12 PM CDT by Lisbet Rubio CMA               Summary   Chief Complaint :   F/u labs-states recheck after having a kidney stone.    Menstrual Status :   N/A   Weight Measured :   194 lb(Converted to: 194 lb 0 oz, 87.997 kg)    Height Measured :   69 in(Converted to: 5 ft 9 in, 175.26 cm)    Body Mass Index :   28.65 kg/m2 (HI)    Body Surface Area :   2.07 m2   Systolic Blood Pressure :   120 mmHg   Diastolic Blood Pressure :   83 mmHg (HI)    Mean Arterial Pressure :   95 mmHg   Peripheral Pulse Rate :   77 bpm   BP Site :   Right arm   BP Method :   Electronic   Temperature Tympanic :   96.4 DegF(Converted to: 35.8 DegC)  (LOW)    Oxygen Saturation :   99 %   Lisbet Rubio CMA - 4/14/2021 3:12 PM CDT   Health Status   Allergies Verified? :   Yes   Medication History Verified? :   Yes   Immunizations Current :   Unknown   Pre-Visit Planning Status :   Completed   Tobacco Use? :   Never smoker   Lisbet Rubio CMA - 4/14/2021 3:12 PM CDT   Consents   Consent for Immunization Exchange :   Consent Granted   Consent for Immunizations to Providers :   Consent Granted   Lisbet Rubio CMA - 4/14/2021 3:12 PM CDT   Meds / Allergies   (As Of: 4/14/2021 3:16:12 PM CDT)   Allergies (Active)   Aspirin Adult Low Strength  Estimated Onset Date:   Unspecified ; Reactions:   Stomach upset ; Created By:   Claudia Barrera LPN; Reaction Status:   Active ; Category:   Drug ; Substance:   Aspirin Adult Low Strength ; Type:   Allergy ; Updated By:   Claudia Barrera LPN; Reviewed Date:   4/14/2021 3:13 PM CDT      Celexa  Estimated Onset Date:   Unspecified ; Reactions:   Agitated ; Created By:   America Montelongo; Reaction Status:   Active ; Category:   Drug ; Substance:   Celexa ; Type:   Allergy ; Updated By:   America Montelongo; Source:   Paper Chart ; Reviewed Date:   4/14/2021 3:13 PM CDT      melatonin  Estimated Onset Date:   Unspecified ;  Reactions:   Anxiety,nausea,hot flashes ; Created By:   Claudia Barrera LPN; Reaction Status:   Active ; Category:   Drug ; Substance:   melatonin ; Type:   Allergy ; Updated By:   Claudia Barrera LPN; Reviewed Date:   4/14/2021 3:13 PM CDT        Medication List   (As Of: 4/14/2021 3:16:12 PM CDT)   Prescription/Discharge Order    sildenafil  :   sildenafil ; Status:   Prescribed ; Ordered As Mnemonic:   Viagra 100 mg oral tablet ; Simple Display Line:   100 mg, 1 tab(s), PO, daily, 9 tab(s), 11 Refill(s) ; Ordering Provider:   Juli Galvan MD; Catalog Code:   sildenafil ; Order Dt/Tm:   4/7/2016 9:12:20 AM CDT          PARoxetine  :   PARoxetine ; Status:   Prescribed ; Ordered As Mnemonic:   PARoxetine 20 mg oral tablet ; Simple Display Line:   20 mg, 1 tab(s), Oral, daily, 90 tab(s), 3 Refill(s) ; Ordering Provider:   Juli Galvan MD; Catalog Code:   PARoxetine ; Order Dt/Tm:   2/3/2021 3:39:22 PM CST            ID Risk Screen   Recent Travel History :   No recent travel   Family Member Travel History :   No recent travel   Other Exposure to Infectious Disease :   Unknown   COVID-19 Testing Status :   No positive COVID-19 test   Lisbet Rubio CMA 4/14/2021 3:12 PM CDT

## 2022-02-15 NOTE — NURSING NOTE
Comprehensive Intake Entered On:  5/16/2019 9:14 AM CDT    Performed On:  5/16/2019 9:07 AM CDT by Valencia Montelongo MA               Summary   Chief Complaint :   pre op @ [facility] 5/17/18 for kidney stone removal   Menstrual Status :   N/A   Weight Measured :   185.8 lb(Converted to: 185 lb 13 oz, 84.28 kg)    Height Measured :   69 in(Converted to: 5 ft 9 in, 175.26 cm)    Body Mass Index :   27.43 kg/m2 (HI)    Body Surface Area :   2.02 m2   Systolic Blood Pressure :   102 mmHg   Diastolic Blood Pressure :   68 mmHg   Mean Arterial Pressure :   79 mmHg   Peripheral Pulse Rate :   77 bpm   BP Site :   Right arm   Pulse Site :   Radial artery   BP Method :   Manual   HR Method :   Manual   Temperature Tympanic :   98.0 DegF(Converted to: 36.7 DegC)    Oxygen Saturation :   94 %   Valencia Montelongo MA - 5/16/2019 9:07 AM CDT   Health Status   Allergies Verified? :   Yes   Medication History Verified? :   Yes   Immunizations Current :   Unknown   Medical History Verified? :   Yes   Pre-Visit Planning Status :   Completed   Tobacco Use? :   Never smoker   Valencia Montelongo MA - 5/16/2019 9:07 AM CDT   Consents   Consent for Immunization Exchange :   Consent Granted   Consent for Immunizations to Providers :   Consent Granted   Valencia Montelongo MA - 5/16/2019 9:07 AM CDT   Meds / Allergies   (As Of: 5/16/2019 9:14:38 AM CDT)   Allergies (Active)   Aspirin Adult Low Strength  Estimated Onset Date:   Unspecified ; Reactions:   Stomach upset ; Created By:   Claudia Barrera LPN; Reaction Status:   Active ; Category:   Drug ; Substance:   Aspirin Adult Low Strength ; Type:   Allergy ; Updated By:   Claudia Barrera LPN; Reviewed Date:   5/16/2019 9:10 AM CDT      Celexa  Estimated Onset Date:   Unspecified ; Reactions:   Agitated ; Created By:   America Montelongo; Reaction Status:   Active ; Category:   Drug ; Substance:   Celexa ; Type:   Allergy ; Updated By:   America Montelongo; Source:   Paper Chart ; Reviewed Date:    5/16/2019 9:10 AM CDT      melatonin  Estimated Onset Date:   Unspecified ; Reactions:   Anxiety,nausea,hot flashes ; Created By:   Claudia Barrera LPN; Reaction Status:   Active ; Category:   Drug ; Substance:   melatonin ; Type:   Allergy ; Updated By:   Claudia Barrera LPN; Reviewed Date:   5/16/2019 9:10 AM CDT        Medication List   (As Of: 5/16/2019 9:14:38 AM CDT)   Prescription/Discharge Order    PARoxetine  :   PARoxetine ; Status:   Prescribed ; Ordered As Mnemonic:   Paxil 30 mg oral tablet ; Simple Display Line:   30 mg, 1 tab(s), PO, Daily, 90 tab(s), 3 Refill(s) ; Ordering Provider:   Juli Galvan MD; Catalog Code:   PARoxetine ; Order Dt/Tm:   10/29/2018 3:53:56 PM          sildenafil  :   sildenafil ; Status:   Prescribed ; Ordered As Mnemonic:   Viagra 100 mg oral tablet ; Simple Display Line:   100 mg, 1 tab(s), PO, daily, 9 tab(s), 11 Refill(s) ; Ordering Provider:   Juli Galvan MD; Catalog Code:   sildenafil ; Order Dt/Tm:   4/7/2016 9:12:20 AM

## 2022-02-15 NOTE — NURSING NOTE
Phone Message    PCP:   BLU      Time of Call:  10:18 am    Phone number:  [phone]    Returned call at: 10:50 am    Note:   Pt called requesting a refill of Paxil 30 mg. Last seen on 5/2019 for a Preop and is due in October for a med check. With [facility] closing his medication had run out. Refilled x 90 days.     Pharmacy: [facility] [place]    Last office visit and reason: 5/2019; preop    Transferred to: n/a

## 2022-02-15 NOTE — LETTER
(Inserted Image. Unable to display)   January 16, 2020      SALUD TRUJILLO   [address] [place], [place] [id number]        Dear SALUD,      Thank you for selecting [facility] ([facility]viously [facility]) for your healthcare needs.      ultrasound of kidneys are unremarkable.  No signs of obstruction.  No signs of any previous chronic obstruction.  We can discuss more at follow up visit.            Please contact me or my assistant at [phone] if you have any questions or concerns.     Sincerely,        Malcolm Herrera MD

## 2022-02-15 NOTE — LETTER
(Inserted Image. Unable to display)   [address] [place] [address]  December 22, 2019      SALUD TRUJILLO       [address], [place] [id number]      Dear SALUD,    Thank you for selecting [facility] for your healthcare needs. Below you will find the results of the recent tests done at our clinic.     Your lab results are unchanged overall from August. We will discuss at your visit on the 26th.     Result Name Current Result Previous Result Reference Range   Sodium Level (mmol/L)  141 12/19/2019  141 8/15/2019 135 - 146   Potassium Level (mmol/L)  4.5 12/19/2019  4.4 8/15/2019 3.5 - 5.3   Chloride Level (mmol/L)  104 12/19/2019  105 8/15/2019 98 - 110   CO2 Level (mmol/L)  28 12/19/2019  29 8/15/2019 20 - 32   Glucose Level (mg/dL) ((H)) 101 12/19/2019  97 8/15/2019 65 - 99   BUN (mg/dL)  23 12/19/2019  20 8/15/2019 7 - 25   Creatinine Level (mg/dL) ((H)) 1.51 12/19/2019 ((H)) 1.51 8/15/2019 0.70 - 1.33   BUN/Creat Ratio  15 12/19/2019  13 8/15/2019 6 - 22   eGFR (mL/min/1.73m2) ((L)) 52 12/19/2019 ((L)) 52 8/15/2019 > OR = 60 -    eGFR  (mL/min/1.73m2)  60 12/19/2019  60 8/15/2019 > OR = 60 -    Calcium Level (mg/dL)  10.0 12/19/2019  9.4 8/15/2019 8.6 - 10.3   Cholesterol (mg/dL) ((H)) 220 12/19/2019 ((H)) 216 8/15/2019  - <200   Non-HDL Cholesterol ((H)) 156 12/19/2019 ((H)) 156 8/15/2019  - <130   HDL (mg/dL)  64 12/19/2019  60 8/15/2019 >40 -    Cholesterol/HDL Ratio  3.4 12/19/2019  3.6 8/15/2019  - <5.0   LDL ((H)) 138 12/19/2019 ((H)) 137 8/15/2019    Triglyceride (mg/dL)  81 12/19/2019  87 8/15/2019  - <150       Please contact me or my assistant at [phone] if you have any questions or concerns.     Sincerely,        Juli Galvan M.D.

## 2022-02-15 NOTE — LETTER
(Inserted Image. Unable to display)   October 29, 2019      SALUD TRUJILLO   [address] [place] [id number]        Dear SALUD,      Thank you for selecting [facility] ([facility]) for your healthcare needs.     Our records indicate you are due for the following services:    Medication Check  Fasting Lab Tests ~ Please do not eat or drink anything 10 hours prior to your scheduled appointment time.                (Water and any medications that you may need are allowed unless directed otherwise.)    If you had your labs done at another facility or with [facility] at [facility], please bring in a copy of the results to your next visit, mail a copy, or drop off a copy of your results to your Healthcare Provider.    You are due for lab work and an office visit; please schedule the lab appointment 1 week before the office visit.  This will assure all results are available to discuss with your provider during your visit.    **It is very helpful if you bring your medication bottles to your appointment.  This assures we have all of your current medications, including strength and dosing information, documented accurately in your medical record.    To schedule an appointment or if you have further questions, please contact your primary clinic:   [facility]  [phone]   [facility]  [phone]             [facility]      [phone]      Powered by [employer]    Sincerely,    Juli Galvan M.D.

## 2022-02-15 NOTE — LETTER
(Inserted Image. Unable to display)   September 20, 2019        MARC TRUJILLO       [address] [place], [place] [id number]        Dear MARC,    Thank you for selecting [facility] for your health care needs.  Below you will find the results of your recent test(s) done at our clinic.     Both lesions were basal cell carcinoma.  Advise follow up in 3 months.      Result Name Current Result   Tissue, 2 Specimen   9/16/2019   Tissue Pathology Report   9/16/2019       Please contact me or my assistant at [phone] if you have any questions about your results.    Sincerely,        Marc Stovall MD        What do your labs mean?  Below is a glossary of commonly ordered labs:  LDL   Bad Cholesterol   HDL   Good Cholesterol  AST/ALT   Liver Function   Cr/Creatinine   Kidney Function  Microalbumin   Kidney Function  BUN   Kidney Function  PSA   Prostate    TSH   Thyroid Hormone  HgbA1c   Diabetes Test   Hgb (Hemoglobin)   Red Blood Cells

## 2022-02-15 NOTE — TELEPHONE ENCOUNTER
---------------------  From: Audelia Amaro CMA (Phone Messages Pool ([id number]_[facility]))   To: Juli Galvan MD;     Sent: 9/3/2019 3:06:42 PM CDT  Subject: Phone Note: F/U Questions     Phone Message    PCP:   BLU      Time of Call:  2:40 pm    Phone number:  [phone]    Returned call at: n/a    Note:   Pt called with a couple follow up questions. States that he recently had a kidney stone removed and on his recent blood work his creatine was marked elevated at 1.51 on 8/15/19. He is calling on how he should follow up on that whether it is medication or f/u blood testing. He also had a CT of chest on 5/20/19. Results noted two small nodules in his left lower lobe. He is wondering about getting these biopsied and tested for cancer. Report notes that f/u in one year is recommended and the sizes were 3 mm and 4 mm. Please advise.    Would like to be called back tomorrow.     Pharmacy: n/a    Last office visit and reason: 5/16/19; preop    Transferred to: Fannie patient and reviewed concerns    will do chem 8 with fasting labs in October, if still elevated will refer on to Dr. Herrera  discussed that nodules were very small, given low risk no follow up was required but we will scan again due to his concern, would recommend waiting the full year to confirm no change, too small to biopsy, no symptoms, if he has concerns he will let me know  also would like skin cancer screening, will set up to see Dr. Stovall

## 2022-02-15 NOTE — NURSING NOTE
Depression Screening Entered On:  2/3/2021 4:50 PM CST    Performed On:  2/3/2021 4:50 PM CST by Lisbet Rubio CMA               Depression Screening   Little Interest - Pleasure in Activities :   Not at all   Feeling Down, Depressed, Hopeless :   Not at all   Initial Depression Screen Score :   0 Score   Poor Appetite or Overeating :   Not at all   Trouble Falling or Staying Asleep :   Not at all   Feeling Tired or Little Energy :   Several days   Feeling Bad About Yourself :   Not at all   Trouble Concentrating :   Not at all   Moving or Speaking Slowly :   Not at all   Thoughts Better Off Dead or Hurting Self :   Not at all   Difficulty at Work, Home, Getting Along :   Not difficult at all   Detailed Depression Screen Score :   1    Total Depression Screen Score :   1    Lisbet Rubio CMA - 2/3/2021 4:50 PM CST

## 2022-03-02 VITALS
DIASTOLIC BLOOD PRESSURE: 78 MMHG | BODY MASS INDEX: 27.63 KG/M2 | TEMPERATURE: 97.8 F | SYSTOLIC BLOOD PRESSURE: 118 MMHG | OXYGEN SATURATION: 96 % | HEART RATE: 65 BPM | WEIGHT: 187.1 LBS

## 2022-03-02 NOTE — LETTER
(Inserted Image. Unable to display)   [address] [place] [address] [phone] (phone) [phone] (fax)  December 31, 2021      SALUD TRUJILLO       [address] [place] [id number]      Dear SALUD,    Thank you for selecting [facility] for your healthcare needs. Below you will find the results of the recent tests done at our clinic.     Your lab results are below and are stable. Let me know if you have questions.     Result Name Current Result Previous Result Reference Range   Cholesterol (mg/dL) ((H)) 220 12/30/2021   - <200   HDL (mg/dL)  56 12/30/2021  > OR = 40 -    Triglyceride (mg/dL)  60 12/30/2021   - <150   LDL ((H)) 149 12/30/2021     Cholesterol/HDL Ratio  3.9 12/30/2021   - <5.0   Non-HDL Cholesterol ((H)) 164 12/30/2021   - <130   Glucose Level (mg/dL)  91 12/30/2021  79 4/6/2021 65 - 99   BUN (mg/dL)  20 12/30/2021  17 4/6/2021 7 - 25   Creatinine Level (mg/dL) ((H)) 1.34 12/30/2021 ((H)) 1.39 4/6/2021 0.70 - 1.33   eGFR (mL/min/1.73m2) ((L)) 59 12/30/2021 ((L)) 57 4/6/2021 > OR = 60 -    BUN/Creat Ratio  15 12/30/2021  12 4/6/2021 6 - 22   Sodium Level (mmol/L)  139 12/30/2021  138 4/6/2021 135 - 146   Potassium Level (mmol/L)  3.9 12/30/2021  3.8 4/6/2021 3.5 - 5.3   Chloride Level (mmol/L)  104 12/30/2021  104 4/6/2021 98 - 110   CO2 Level (mmol/L)  29 12/30/2021  26 4/6/2021 20 - 32   Calcium Level (mg/dL)  9.5 12/30/2021  9.8 4/6/2021 8.6 - 10.3       Please contact me or my assistant at [phone] if you have any questions or concerns.     Sincerely,        Juli Galvan M.D.

## 2022-03-02 NOTE — NURSING NOTE
Comprehensive Intake Entered On:  1/20/2022 7:22 AM CST    Performed On:  1/20/2022 7:18 AM CST by Valencia Toro               Summary   Chief Complaint :   Annual px.    Menstrual Status :   N/A   Weight Measured :   187.1 lb(Converted to: 187 lb 2 oz, 84.867 kg)    Systolic Blood Pressure :   118 mmHg   Diastolic Blood Pressure :   78 mmHg   Mean Arterial Pressure :   91 mmHg   Peripheral Pulse Rate :   65 bpm   BP Site :   Right arm   BP Method :   Manual   HR Method :   Electronic   Temperature Tympanic :   97.8 DegF(Converted to: 36.6 DegC)  (LOW)    Oxygen Saturation :   96 %   Valencia Toro - 1/20/2022 7:18 AM CST   Health Status   Allergies Verified? :   Yes   Medication History Verified? :   Yes   Immunizations Current :   Unknown   Medical History Verified? :   Yes   Pre-Visit Planning Status :   Completed   Tobacco Use? :   Never smoker   Valencia Toro - 1/20/2022 7:18 AM CST   Consents   Consent for Immunization Exchange :   Consent Granted   Consent for Immunizations to Providers :   Consent Granted   Valencia Toro - 1/20/2022 7:18 AM CST   Meds / Allergies   (As Of: 1/20/2022 7:22:30 AM CST)   Allergies (Active)   Aspirin Adult Low Strength  Estimated Onset Date:   Unspecified ; Reactions:   Stomach upset ; Created By:   Claudia Barrera LPN; Reaction Status:   Active ; Category:   Drug ; Substance:   Aspirin Adult Low Strength ; Type:   Allergy ; Updated By:   Claudia Barrera LPN; Reviewed Date:   1/20/2022 7:18 AM CST      Celexa  Estimated Onset Date:   Unspecified ; Reactions:   Agitated ; Created By:   America Montelongo; Reaction Status:   Active ; Category:   Drug ; Substance:   Celexa ; Type:   Allergy ; Updated By:   America Montelongo; Source:   Paper Chart ; Reviewed Date:   1/20/2022 7:18 AM CST      melatonin  Estimated Onset Date:   Unspecified ; Reactions:   Anxiety,nausea,hot flashes ; Created By:   Claudia Barrera LPN; Reaction Status:   Active ; Category:   Drug ;  Substance:   melatonin ; Type:   Allergy ; Updated By:   Claudia Barrera LPN; Reviewed Date:   1/20/2022 7:18 AM CST        Medication List   (As Of: 1/20/2022 7:22:30 AM CST)   Prescription/Discharge Order    sildenafil  :   sildenafil ; Status:   Prescribed ; Ordered As Mnemonic:   Viagra 100 mg oral tablet ; Simple Display Line:   100 mg, 1 tab(s), PO, daily, 9 tab(s), 11 Refill(s) ; Ordering Provider:   Juli Galvan MD; Catalog Code:   sildenafil ; Order Dt/Tm:   4/7/2016 9:12:20 AM CDT          PARoxetine  :   PARoxetine ; Status:   Prescribed ; Ordered As Mnemonic:   PARoxetine 20 mg oral tablet ; Simple Display Line:   20 mg, 1 tab(s), Oral, daily, 90 tab(s), 3 Refill(s) ; Ordering Provider:   Juli Galvan MD; Catalog Code:   PARoxetine ; Order Dt/Tm:   2/3/2021 3:39:22 PM CST

## 2022-03-02 NOTE — PROGRESS NOTES
Patient:   SALUD TRUJILLO            MRN: [id number]            FIN: [id number]               Age:   56 years     Sex:  Male     :  1965   Associated Diagnoses:   Well adult exam; Urinary frequency; Screening for prostate cancer; Urinary stream slowing; Major depression in full remission   Author:   Juli Galvan MD      Chief Complaint   2022 7:18 AM CST    Annual px.      Well Adult History   Well Adult History             The patient presents for well adult exam.  The patient's general health status is described as good.  urinary symptoms, note below.  The patient's diet is described as balanced.  Exercise: occasional.     Colon cancer screening due . Declines flu shot. Up to date on COVID vaccine  Notes urinary stream is less, does not feel like he fully empties, slight dribbling, no hesitancy, no changes to urine  Will schedule eye exam and dental visit.       Review of Systems   Ear/Nose/Mouth/Throat:  Decreased hearing, tinnitus.    Genitourinary:  nocturia.    ROS reviewed as documented in chart      Health Status   Allergies:    Allergic Reactions (All)  Severity Not Documented  Aspirin Adult Low Strength (Stomach upset)  Celexa (Agitated)  Melatonin (Anxiety,nausea,hot flashes)   Medications:  (Selected)   Prescriptions  Prescribed  PARoxetine 20 mg oral tablet: = 1 tab(s) ( 20 mg ), Oral, daily, # 90 tab(s), 3 Refill(s), Type: Maintenance, Pharmacy: [facility] #[id number], 1 tab(s) Oral daily, 69, in, 21 15:20:00 CST, Height Measured, 192, lb, 21 15:20:00 CST, Weight Measured  Viagra 100 mg oral tablet: 1 tab(s) ( 100 mg ), PO, daily, # 9 tab(s), 11 Refill(s), Type: Soft Stop, Pharmacy: [facility] #[id number], 1 tab(s) po daily   Problem list:    All Problems  Major depression in full remission / SNOMED CT 10960544 / Confirmed  BCC (basal cell carcinoma) / SNOMED CT 656100971 / Confirmed      Histories   Past Medical History:    Resolved  Moderate recurrent major  depression (ICD-9-.32):  Resolved.  Vestibular schwannoma (SNOMED CT 368602824):  Resolved.   Family History:    CA - Lung cancer  Father ()  Pancreatic cancer  Mother ()  CA - Cancer  Father ()     Procedure history:    Lithotripsy using laser (7209030115) on 2019 at 53 Years.  Colonoscopy (506995321) on 2016 at 50 Years.  Comments:  2016 8:18 AM DUCT - Delon Patel MD  Indication: Screening  Sedation: Versed 3 mg, Fentanyl 150 mcg  Findings: Mild Sigmoid diverticulosis  Recommendation: Repeat in 10 years  Esophagogastroduodenoscopy (6094736775) on 2011 at 45 Years.  Removal of vestibular schwannoma (589459185) in  at 43 Years.  Tonsillectomy and adenoidectomy (868531269).   Social History:        Electronic Cigarette/Vaping Assessment: Denies Electronic Cigarette Use            Electronic Cigarette Use: Never.      Alcohol Assessment            Beer (12 oz), 3 x's per week., 2 drinks/episode average.  3 drinks/episode maximum.      Tobacco Assessment: Denies Tobacco Use            Never (less than 100 in lifetime)      Substance Abuse Assessment            Never      Employment and Education Assessment            Employed, Work/School description: .      Home and Environment Assessment            Marital status:  (Living together).  Spouse/Partner name: Kortney.  Lives with Spouse.  Living situation:               Home/Independent.      Nutrition and Health Assessment            Type of diet: Regular.      Exercise and Physical Activity Assessment: Does not exercise        Physical Examination   Vital Signs   2022 7:18 AM CST Temperature Tympanic 97.8 DegF  LOW    Peripheral Pulse Rate 65 bpm    HR Method Electronic    Systolic Blood Pressure 118 mmHg    Diastolic Blood Pressure 78 mmHg    Mean Arterial Pressure 91 mmHg    BP Site Right arm    BP Method Manual    Oxygen Saturation 96 %      Measurements from flowsheet : Measurements    1/20/2022 7:18 AM CST    Weight Measured - Standard                187.1 lb     General:  Alert and oriented, No acute distress.    Eye:  Pupils are equal, round and reactive to light, Normal conjunctiva.    HENT:  Normocephalic, Tympanic membranes are clear, Normal hearing, No pharyngeal erythema.    Neck:  Supple, Non-tender, No lymphadenopathy, No thyromegaly.    Respiratory:  Lungs are clear to auscultation, Respirations are non-labored, Breath sounds are equal.    Cardiovascular:  Normal rate, Regular rhythm.    Gastrointestinal:  Soft, Non-tender, Non-distended, Normal bowel sounds, No organomegaly.    Musculoskeletal:  Normal range of motion, No deformity.    Integumentary:  Warm, Dry.    Neurologic:  Alert, Oriented.       Health Maintenance      Recommendations     Pending (in the next year)        OverDue           COVID-19 Vaccine (Mar) Dose 2 due  07/26/21  One-time only           Influenza Vaccine due  09/01/21  and every 1  year(s)        Due            Hepatitis C Screen [id number] due  01/20/22  One-time only        Near Due            Alcohol Misuse Screen near due  02/03/22  and every 1  year(s)           Depression Screen near due  02/03/22  and every 1  year(s)        Postponed            Aspirin Therapy for Prevention of CVD and CRC due  01/24/22  and every 5  year(s)        Due In Future            COVID-19 Vaccine (Moderna) Dose 2 not due until  02/14/22  One-time only           Body Mass Index Check not due until  04/14/22  and every 1  year(s)           Lipid Disorders Screen not due until  12/30/22  and every 1  year(s)     Satisfied (in the past 1 year)        Satisfied            Alcohol Misuse Screen on  02/03/21.           Alcohol Misuse Screen on  02/03/21.           Body Mass Index Check on  04/14/21.           Body Mass Index Check on  02/03/21.           COVID-19 Vaccine (Mar) Dose 1 on  05/31/21.           COVID-19 Vaccine (Moderna) Dose 1 on  01/17/22.            Depression Screen on  02/03/21.           Depression Screen on  02/03/21.           Depression Screen on  02/03/21.           High Blood Pressure Screen on  01/20/22.           High Blood Pressure Screen on  04/14/21.           High Blood Pressure Screen on  02/03/21.           Lipid Disorders Screen on  12/30/21.           Lipid Disorders Screen on  12/30/21.           Lipid Disorders Screen on  12/30/21.           Lipid Disorders Screen on  12/30/21.           Tobacco Use Screen on  01/20/22.           Tobacco Use Screen on  04/14/21.           Tobacco Use Screen on  02/03/21.        Postponed            Aspirin Therapy for Prevention of CVD and CRC until  01/29/21.        Canceled            Lung Cancer Screen on  02/03/21.          Review / Management   Results review:  Lab results   12/30/2021 7:19 AM CST Sodium Level 139 mmol/L    Potassium Level 3.9 mmol/L    Chloride Level 104 mmol/L    CO2 Level 29 mmol/L    Glucose Level 91 mg/dL    BUN 20 mg/dL    Creatinine 1.34 mg/dL  HI    BUN/Creat Ratio 15    eGFR 59 mL/min/1.73m2  LOW    eGFR African American 68 mL/min/1.73m2    Calcium Level 9.5 mg/dL    Cholesterol 220 mg/dL  HI    Non-  HI    HDL 56 mg/dL    Chol/HDL Ratio 3.9      HI    Triglyceride 60 mg/dL   .       Impression and Plan   Diagnosis     Well adult exam (QHN42-RF Z00.00).     Plan:  preventive services reviewed, will check PSA, colon cancer screening up to date, BP controlled.    Summary:  recheck lipid and chem 8 in 12 months.    Diagnosis     Urinary frequency (PTD54-OZ R35.0).     Screening for prostate cancer (ODY61-EC Z12.5).     Urinary stream slowing (JZZ92-GZ R39.198).     Course:  will check UA and PSA. If both are normal, will consider urology referral vs Flomax.    Diagnosis     Major depression in full remission (AXT22-RD F32.5).     Course:  well controlled, will continue same medication.    Orders     Orders (Selected)   Prescriptions  Prescribed  PARoxetine 20 mg oral  tablet: = 1 tab(s) ( 20 mg ), Oral, daily, # 90 tab(s), 3 Refill(s), Type: Maintenance, Pharmacy: [facility] [id number] [facility], 1 tab(s) Oral daily, 69, in, 04/14/21 15:12:00 CDT, Height Measured, 187.1, lb, 01/20/22 7:18:00 CST, Weight Measured.

## 2022-03-02 NOTE — LETTER
(Inserted Image. Unable to display)   [address], [place] [id number] [phone] (phone) [phone] (fax)  January 21, 2022      SALUD TRUJILLO       [address] [place] [id number]      Dear SALUD,    Thank you for selecting [facility] for your healthcare needs. Below you will find the results of the recent tests done at our clinic.     PSA is normal and your urine test was also normal. Please let me know if you would like to see a urologist or try medication such an tamsulosin to see if it helps symptoms.    Result Name Current Result Reference Range   PSA (ng/mL)  1.30 1/20/2022  - < OR = 4.00       Please contact me or my assistant at [phone] if you have any questions or concerns.     Sincerely,        Juli Galvan M.D.

## 2022-06-16 NOTE — PROCEDURES
Accession Number:       [id number]  A SOURCE:     Skin, left ear, shave biopsy  A GROSS DESCRIPTION:     See comment       Specimen is received in formalin, labeled with       multiple patient identifier(s) and consists of       one piece of skin measuring 0.3 x 0.3 x 0.1 cm,       irregular in shape and tan-gray in color. The       margins are inked green. The specimen is entirely       submitted in one cassette(s).           Gross exam(s) performed at: [address] [address]         : JULI DIAZ MD  A DIAGNOSIS:     Basal cell carcinoma, nodular type, extending to the deep margin.  A COMMENT:     Appropriate clinical treatment is recommended.  B SOURCE:     Skin, middle back, shave biopsy  B GROSS DESCRIPTION:     See comment       Specimen is received in formalin, labeled with       multiple patient identifier(s) and consists of       one piece of skin measuring 0.8 x 0.7 x 0.2 cm,       irregular in shape and tan-gray in color. The       margins are inked green. The specimen is serially       sectioned and entirely submitted in one       cassette(s).  B DIAGNOSIS:     Basal cell carcinoma, superficial type, closely approaching the peripheral margin(s).  B COMMENT:     Clinical correlation/follow-up is recommended.

## 2022-06-16 NOTE — PROCEDURES
Accession Number:       [id number]  PATHOLOGIST:     Aurora Watson MD Board Certified in Anatomic Pathology and Clinical Pathology (electronic signature)

## 2023-02-09 DIAGNOSIS — F33.42 MAJOR DEPRESSIVE DISORDER, RECURRENT EPISODE, IN FULL REMISSION (H): Primary | ICD-10-CM

## 2023-02-09 RX ORDER — PAROXETINE 20 MG/1
TABLET, FILM COATED ORAL
Qty: 90 TABLET | Refills: 0 | Status: SHIPPED | OUTPATIENT
Start: 2023-02-09 | End: 2023-05-17

## 2023-02-09 NOTE — TELEPHONE ENCOUNTER
"      Last office visit: 1/20/22      Future Appointments 2/9/2023 - 8/8/2023    None          Requested Prescriptions   Pending Prescriptions Disp Refills     PARoxetine (PAXIL) 20 MG tablet [Pharmacy Med Name: PAROXETINE HCL 20 MG TABLET] 90 tablet 3     Sig: TAKE 1 TABLET BY MOUTH EVERY DAY       SSRIs Protocol Failed - 2/9/2023 12:40 AM        Failed - Recent (12 mo) or future (30 days) visit within the authorizing provider's specialty     Patient has had an office visit with the authorizing provider or a provider within the authorizing providers department within the previous 12 mos or has a future within next 30 days. See \"Patient Info\" tab in inbasket, or \"Choose Columns\" in Meds & Orders section of the refill encounter.              Failed - Medication is active on med list        Passed - Patient is age 18 or older                   "

## 2023-05-13 DIAGNOSIS — F33.42 MAJOR DEPRESSIVE DISORDER, RECURRENT EPISODE, IN FULL REMISSION (H): ICD-10-CM

## 2023-05-15 NOTE — TELEPHONE ENCOUNTER
Please call patient and get him scheduled.  I will refill once he is got an appointment scheduled.

## 2023-05-15 NOTE — TELEPHONE ENCOUNTER
"    Last office visit: 1/20/2022  Last RX: 2/9/2023   #90     R-0        Future Appointments 5/15/2023 - 11/11/2023    None            Requested Prescriptions   Pending Prescriptions Disp Refills     PARoxetine (PAXIL) 20 MG tablet [Pharmacy Med Name: PAROXETINE HCL 20 MG TABLET] 90 tablet 0     Sig: TAKE 1 TABLET BY MOUTH EVERY DAY       SSRIs Protocol Failed - 5/13/2023  8:01 AM        Failed - PHQ-9 score less than 5 in past 6 months     Please review last PHQ-9 score.           Failed - Recent (6 mo) or future (30 days) visit within the authorizing provider's specialty     Patient had office visit in the last 6 months or has a visit in the next 30 days with authorizing provider or within the authorizing provider's specialty.  See \"Patient Info\" tab in inbasket, or \"Choose Columns\" in Meds & Orders section of the refill encounter.            Passed - Medication is active on med list        Passed - Patient is age 18 or older                   "

## 2023-05-17 RX ORDER — PAROXETINE 20 MG/1
TABLET, FILM COATED ORAL
Qty: 30 TABLET | Refills: 0 | Status: SHIPPED | OUTPATIENT
Start: 2023-05-17 | End: 2023-06-07

## 2023-05-17 NOTE — TELEPHONE ENCOUNTER
I called and left message to call the clinic back, if/when patient calls back please inform him that he is OVER DUE for a physical with PCP for further refills.

## 2023-05-18 NOTE — TELEPHONE ENCOUNTER
Patient is scheduled with Dr. Galvan 06/07 for physical, encounter closed and routed to PCP as FYI only.

## 2023-06-07 ENCOUNTER — OFFICE VISIT (OUTPATIENT)
Dept: FAMILY MEDICINE | Facility: CLINIC | Age: 58
End: 2023-06-07
Payer: COMMERCIAL

## 2023-06-07 VITALS
SYSTOLIC BLOOD PRESSURE: 100 MMHG | RESPIRATION RATE: 14 BRPM | BODY MASS INDEX: 27.43 KG/M2 | OXYGEN SATURATION: 99 % | WEIGHT: 185.2 LBS | HEIGHT: 69 IN | DIASTOLIC BLOOD PRESSURE: 62 MMHG | HEART RATE: 64 BPM

## 2023-06-07 DIAGNOSIS — F33.42 MAJOR DEPRESSIVE DISORDER, RECURRENT EPISODE, IN FULL REMISSION (H): ICD-10-CM

## 2023-06-07 DIAGNOSIS — Z00.00 WELL ADULT EXAM: Primary | ICD-10-CM

## 2023-06-07 DIAGNOSIS — N52.9 ERECTILE DYSFUNCTION, UNSPECIFIED ERECTILE DYSFUNCTION TYPE: ICD-10-CM

## 2023-06-07 DIAGNOSIS — Z12.5 SCREENING FOR PROSTATE CANCER: ICD-10-CM

## 2023-06-07 DIAGNOSIS — Z13.6 SCREENING FOR CARDIOVASCULAR CONDITION: ICD-10-CM

## 2023-06-07 DIAGNOSIS — Z13.1 SCREENING FOR DIABETES MELLITUS: ICD-10-CM

## 2023-06-07 PROBLEM — F32.5 MAJOR DEPRESSION IN REMISSION (H): Status: ACTIVE | Noted: 2023-06-07

## 2023-06-07 PROBLEM — C44.91 BASAL CELL CARCINOMA (BCC): Status: ACTIVE | Noted: 2023-06-07

## 2023-06-07 PROCEDURE — 99213 OFFICE O/P EST LOW 20 MIN: CPT | Mod: 25 | Performed by: FAMILY MEDICINE

## 2023-06-07 PROCEDURE — 99396 PREV VISIT EST AGE 40-64: CPT | Performed by: FAMILY MEDICINE

## 2023-06-07 RX ORDER — SILDENAFIL 100 MG/1
100 TABLET, FILM COATED ORAL DAILY PRN
COMMUNITY
Start: 2022-01-20 | End: 2023-06-07

## 2023-06-07 RX ORDER — PAROXETINE 20 MG/1
10 TABLET, FILM COATED ORAL DAILY
Qty: 90 TABLET | Refills: 3 | Status: SHIPPED | OUTPATIENT
Start: 2023-06-07 | End: 2024-07-09

## 2023-06-07 RX ORDER — PAROXETINE 20 MG/1
20 TABLET, FILM COATED ORAL DAILY
Qty: 90 TABLET | Refills: 2 | Status: CANCELLED | OUTPATIENT
Start: 2023-06-07

## 2023-06-07 RX ORDER — SILDENAFIL 100 MG/1
100 TABLET, FILM COATED ORAL DAILY PRN
Qty: 9 TABLET | Refills: 11 | Status: SHIPPED | OUTPATIENT
Start: 2023-06-07

## 2023-06-07 ASSESSMENT — ENCOUNTER SYMPTOMS
COUGH: 0
EYES NEGATIVE: 1
ENDOCRINE NEGATIVE: 1
PSYCHIATRIC NEGATIVE: 1
ABDOMINAL PAIN: 0
NEUROLOGICAL NEGATIVE: 1
HEMATOCHEZIA: 0
CONSTIPATION: 0
ALLERGIC/IMMUNOLOGIC NEGATIVE: 1
HEMATOLOGIC/LYMPHATIC NEGATIVE: 1
MUSCULOSKELETAL NEGATIVE: 1
CHILLS: 0
HEMATURIA: 0

## 2023-06-07 ASSESSMENT — PATIENT HEALTH QUESTIONNAIRE - PHQ9
SUM OF ALL RESPONSES TO PHQ QUESTIONS 1-9: 3
SUM OF ALL RESPONSES TO PHQ QUESTIONS 1-9: 3
10. IF YOU CHECKED OFF ANY PROBLEMS, HOW DIFFICULT HAVE THESE PROBLEMS MADE IT FOR YOU TO DO YOUR WORK, TAKE CARE OF THINGS AT HOME, OR GET ALONG WITH OTHER PEOPLE: NOT DIFFICULT AT ALL

## 2023-06-07 NOTE — PROGRESS NOTES
SUBJECTIVE:   CC: Marc is an 57 year old who presents for preventative health visit.       6/7/2023    12:42 PM   Additional Questions   Roomed by Bianca SARKAR CMA   Accompanied by None   Answers for HPI/ROS submitted by the patient on 6/7/2023  If you checked off any problems, how difficult have these problems made it for you to do your work, take care of things at home, or get along with other people?: Not difficult at all  PHQ9 TOTAL SCORE: 3    Healthy Habits:     Getting at least 3 servings of Calcium per day:  Yes    Bi-annual eye exam:  NO    Dental care twice a year:  Yes    Sleep apnea or symptoms of sleep apnea:  Excessive snoring    Diet:  Regular (no restrictions)    Frequency of exercise:  None    Taking medications regularly:  Yes    Barriers to taking medications:  None    Medication side effects:  Not applicable    PHQ-2 Total Score: 1    Additional concerns today:  Yes    Patient with depression, overall well controlled, has retired, adjusting. Current medication is working well but does have some symptoms of fatigue. Occasional anxiety with life changes. Due for refills    Have you ever done Advance Care Planning? (For example, a Health Directive, POLST, or a discussion with a medical provider or your loved ones about your wishes): Yes, patient states has an Advance Care Planning document and will bring a copy to the clinic.    Social History     Tobacco Use     Smoking status: Never     Passive exposure: Never     Smokeless tobacco: Never   Vaping Use     Vaping status: Never Used     Passive vaping exposure: Yes   Substance Use Topics     Alcohol use: Not on file             6/7/2023    12:33 PM   Alcohol Use   Prescreen: >3 drinks/day or >7 drinks/week? No       Last PSA:   Prostate Specific Antigen Screen   Date Value Ref Range Status   01/20/2022 1.30 < OR = 4.00 ng/mL Final     Comment:     The total PSA value from this assay system is   standardized against the WHO standard. The test   result  "will be approximately 20% lower when compared   to the equimolar-standardized total PSA (Ariana   Madhu). Comparison of serial PSA results should be   interpreted with this fact in mind.     This test was performed using the Siemens   chemiluminescent method. Values obtained from   different assay methods cannot be used  interchangeably. PSA levels, regardless of  value, should not be interpreted as absolute  evidence of the presence or absence of disease.  Lab test performed by:  Lab Mnemonic:JULIETTE  [facility]  [address] [place] [address]  Darrel Diamond M.D.  QUEST Specimen received date and time: 21-JAN-2022 02:30:00.00         Reviewed orders with patient. Reviewed health maintenance and updated orders accordingly - Yes      Reviewed and updated as needed this visit by clinical staff   Tobacco  Allergies  Meds  Problems  Med Hx  Surg Hx  Fam Hx          Reviewed and updated as needed this visit by Provider   Tobacco  Allergies  Meds  Problems  Med Hx  Surg Hx  Fam Hx             Review of Systems   Constitutional: Negative for chills.   HENT: Negative for congestion.    Eyes: Negative.    Respiratory: Negative for cough.    Cardiovascular: Negative for chest pain.   Gastrointestinal: Negative for abdominal pain, constipation and hematochezia.   Endocrine: Negative.    Genitourinary: Negative for hematuria.   Musculoskeletal: Negative.    Skin: Negative.    Allergic/Immunologic: Negative.    Neurological: Negative.    Hematological: Negative.    Psychiatric/Behavioral: Negative.          OBJECTIVE:   /62   Pulse 64   Resp 14   Ht 1.759 m (5' 9.25\")   Wt 84 kg (185 lb 3.2 oz)   SpO2 99%   BMI 27.15 kg/m      Physical Exam  GENERAL: healthy, alert and no distress  EYES: Eyes grossly normal to inspection, PERRL and conjunctivae and sclerae normal  HENT: ear canals and TM's normal, nose and mouth without ulcers or lesions  NECK: no adenopathy, no asymmetry, " masses, or scars and thyroid normal to palpation  RESP: lungs clear to auscultation - no rales, rhonchi or wheezes  CV: regular rate and rhythm, normal S1 S2, no S3 or S4, no murmur, click or rub, no peripheral edema and peripheral pulses strong  ABDOMEN: soft, nontender, no hepatosplenomegaly, no masses and bowel sounds normal  MS: no gross musculoskeletal defects noted, no edema  SKIN: no suspicious lesions or rashes  NEURO: Normal strength and tone, mentation intact and speech normal  PSYCH: mentation appears normal, affect normal/bright        ASSESSMENT/PLAN:   Well adult exam  Health maintenance updated, preventive services reviewed, vaccines discussed, questions are answered, patient encouraged to continue annual wellness visits to review preventive services    Major depressive disorder, recurrent episode, in full remission (H)  Well controlled on current medication, refilled for a year, given symptoms of fatigue, OK to try decreasing dose to 10mg as trial and monitor for worsening  - PARoxetine (PAXIL) 20 MG tablet; Take 0.5 tablets (10 mg) by mouth daily OK to decrease to 1/2 tablet and monitor symptoms    Screening for prostate cancer  Labs next week  - PSA, screen; Future    Screening for cardiovascular condition  Return for fasting labs next week  - Lipid panel reflex to direct LDL Fasting; Future    Screening for diabetes mellitus  Fasting labs next week  - Basic metabolic panel; Future    Erectile dysfunction, unspecified erectile dysfunction type  Refilled medication  - sildenafil (VIAGRA) 100 MG tablet; Take 1 tablet (100 mg) by mouth daily as needed (ED)      Patient has been advised of split billing requirements and indicates understanding: Yes      COUNSELING:   Reviewed preventive health counseling, as reflected in patient instructions        He reports that he has never smoked. He has never been exposed to tobacco smoke. He has never used smokeless tobacco.            Juli Galvan MD  M  [facility]

## 2023-06-13 ENCOUNTER — LAB (OUTPATIENT)
Dept: LAB | Facility: CLINIC | Age: 58
End: 2023-06-13
Payer: COMMERCIAL

## 2023-06-13 DIAGNOSIS — N18.31 STAGE 3A CHRONIC KIDNEY DISEASE (H): ICD-10-CM

## 2023-06-13 DIAGNOSIS — Z13.6 SCREENING FOR CARDIOVASCULAR CONDITION: ICD-10-CM

## 2023-06-13 DIAGNOSIS — Z12.5 SCREENING FOR PROSTATE CANCER: ICD-10-CM

## 2023-06-13 DIAGNOSIS — Z13.1 SCREENING FOR DIABETES MELLITUS: ICD-10-CM

## 2023-06-13 LAB
ANION GAP SERPL CALCULATED.3IONS-SCNC: 11 MMOL/L (ref 7–15)
BUN SERPL-MCNC: 22.9 MG/DL (ref 6–20)
CALCIUM SERPL-MCNC: 9.4 MG/DL (ref 8.6–10)
CHLORIDE SERPL-SCNC: 104 MMOL/L (ref 98–107)
CHOLEST SERPL-MCNC: 223 MG/DL
CREAT SERPL-MCNC: 1.5 MG/DL (ref 0.67–1.17)
DEPRECATED HCO3 PLAS-SCNC: 26 MMOL/L (ref 22–29)
GFR SERPL CREATININE-BSD FRML MDRD: 54 ML/MIN/1.73M2
GLUCOSE SERPL-MCNC: 97 MG/DL (ref 70–99)
HDLC SERPL-MCNC: 59 MG/DL
LDLC SERPL CALC-MCNC: 152 MG/DL
NONHDLC SERPL-MCNC: 164 MG/DL
POTASSIUM SERPL-SCNC: 4.3 MMOL/L (ref 3.4–5.3)
PSA SERPL DL<=0.01 NG/ML-MCNC: 1.73 NG/ML (ref 0–3.5)
SODIUM SERPL-SCNC: 141 MMOL/L (ref 136–145)
TRIGL SERPL-MCNC: 58 MG/DL

## 2023-06-13 PROCEDURE — 36415 COLL VENOUS BLD VENIPUNCTURE: CPT

## 2023-06-13 PROCEDURE — 80048 BASIC METABOLIC PNL TOTAL CA: CPT

## 2023-06-13 PROCEDURE — G0103 PSA SCREENING: HCPCS

## 2023-06-13 PROCEDURE — 80061 LIPID PANEL: CPT

## 2023-06-16 PROBLEM — N18.31 STAGE 3A CHRONIC KIDNEY DISEASE (H): Status: ACTIVE | Noted: 2023-06-16

## 2023-07-13 ENCOUNTER — TELEPHONE (OUTPATIENT)
Dept: FAMILY MEDICINE | Facility: CLINIC | Age: 58
End: 2023-07-13
Payer: COMMERCIAL

## 2023-07-13 DIAGNOSIS — F33.42 MAJOR DEPRESSIVE DISORDER, RECURRENT EPISODE, IN FULL REMISSION (H): ICD-10-CM

## 2023-07-13 RX ORDER — PAROXETINE 20 MG/1
10 TABLET, FILM COATED ORAL DAILY
Qty: 90 TABLET | Refills: 3 | Status: CANCELLED | OUTPATIENT
Start: 2023-07-13

## 2023-07-13 NOTE — TELEPHONE ENCOUNTER
That is great to hear.  I sent in a years worth of refills at his visit in June so he should be all set.

## 2023-07-13 NOTE — TELEPHONE ENCOUNTER
FYI - Status Update    Who is Calling: patient    Update: calling to let us know that he's doing great on his current dose (10mg) of medication and wanted to make sure that any future Rx's get sent to [facility] in [place].     Does caller want a call/response back: Yes, if anyone as any questions he said to not hesitate to give him a call.     Okay to leave a detailed message?: Yes at Home number on file [phone] (home)    (SIDE NOTE: This shyla was the most  shyla ever!  He was so happy-go-james and just super nice :) )

## 2023-12-19 ENCOUNTER — OFFICE VISIT (OUTPATIENT)
Dept: FAMILY MEDICINE | Facility: CLINIC | Age: 58
End: 2023-12-19
Payer: COMMERCIAL

## 2023-12-19 VITALS
BODY MASS INDEX: 27.7 KG/M2 | WEIGHT: 187 LBS | RESPIRATION RATE: 20 BRPM | DIASTOLIC BLOOD PRESSURE: 72 MMHG | OXYGEN SATURATION: 97 % | TEMPERATURE: 97.1 F | HEIGHT: 69 IN | SYSTOLIC BLOOD PRESSURE: 110 MMHG | HEART RATE: 72 BPM

## 2023-12-19 DIAGNOSIS — J01.01 ACUTE RECURRENT MAXILLARY SINUSITIS: ICD-10-CM

## 2023-12-19 DIAGNOSIS — N18.31 STAGE 3A CHRONIC KIDNEY DISEASE (H): Primary | ICD-10-CM

## 2023-12-19 LAB
ANION GAP SERPL CALCULATED.3IONS-SCNC: 9 MMOL/L (ref 7–15)
BUN SERPL-MCNC: 17.2 MG/DL (ref 6–20)
CALCIUM SERPL-MCNC: 9.7 MG/DL (ref 8.6–10)
CHLORIDE SERPL-SCNC: 104 MMOL/L (ref 98–107)
CREAT SERPL-MCNC: 1.39 MG/DL (ref 0.67–1.17)
DEPRECATED HCO3 PLAS-SCNC: 28 MMOL/L (ref 22–29)
EGFRCR SERPLBLD CKD-EPI 2021: 59 ML/MIN/1.73M2
GLUCOSE SERPL-MCNC: 98 MG/DL (ref 70–99)
POTASSIUM SERPL-SCNC: 3.9 MMOL/L (ref 3.4–5.3)
SODIUM SERPL-SCNC: 141 MMOL/L (ref 135–145)

## 2023-12-19 PROCEDURE — 36415 COLL VENOUS BLD VENIPUNCTURE: CPT | Performed by: PHYSICIAN ASSISTANT

## 2023-12-19 PROCEDURE — 99214 OFFICE O/P EST MOD 30 MIN: CPT | Performed by: PHYSICIAN ASSISTANT

## 2023-12-19 PROCEDURE — 80048 BASIC METABOLIC PNL TOTAL CA: CPT | Performed by: PHYSICIAN ASSISTANT

## 2023-12-19 ASSESSMENT — ENCOUNTER SYMPTOMS
FEVER: 0
CHILLS: 0
COUGH: 0
FATIGUE: 1
RHINORRHEA: 1

## 2023-12-19 ASSESSMENT — PATIENT HEALTH QUESTIONNAIRE - PHQ9
SUM OF ALL RESPONSES TO PHQ QUESTIONS 1-9: 1
10. IF YOU CHECKED OFF ANY PROBLEMS, HOW DIFFICULT HAVE THESE PROBLEMS MADE IT FOR YOU TO DO YOUR WORK, TAKE CARE OF THINGS AT HOME, OR GET ALONG WITH OTHER PEOPLE: NOT DIFFICULT AT ALL
SUM OF ALL RESPONSES TO PHQ QUESTIONS 1-9: 1

## 2023-12-19 NOTE — PROGRESS NOTES
"  Assessment & Plan     Stage 3a chronic kidney disease (H)  Chem-8 pending  - Basic metabolic panel    Acute recurrent maxillary sinusitis  Fluids oral decongestants follow-up as needed  - amoxicillin-clavulanate (AUGMENTIN) 875-125 MG tablet  Dispense: 20 tablet; Refill: 0               BMI:   Estimated body mass index is 27.42 kg/m  as calculated from the following:    Height as of this encounter: 1.759 m (5' 9.25\").    Weight as of this encounter: 84.8 kg (187 lb).           JASMEET Benítez  [facility]    Wing Tran is a 58 year old, presenting for the following health issues:  Sinus Problem (Nasal congestion with mucus, sinus pressure, decrease in taste for the past 3 weeks )        12/19/2023     7:29 AM   Additional Questions   Roomed by KAREN Paz       58-year-old presents to the clinic with complaint of nasal congestion fullness and purulent rhinorrhea  No fever no chills  He has had a decreased sense of taste Home COVID test have been negative  No real cough  No severe headache  He has a history of elevated creatinine we will recheck a Chem-8 today    Sinus Problem   Associated symptoms include congestion. Pertinent negatives include no chills and no cough.   History of Present Illness       Reason for visit:  Congestion low energy no taste  Symptom onset:  3-4 weeks ago  Symptoms include:  Congestion  Symptom intensity:  Mild  Symptom progression:  Staying the same  Had these symptoms before:  Yes  Has tried/received treatment for these symptoms:  No  What makes it worse:  No  What makes it better:  Rest    He eats 2-3 servings of fruits and vegetables daily.He consumes 2 sweetened beverage(s) daily.He exercises with enough effort to increase his heart rate 9 or less minutes per day.  He exercises with enough effort to increase his heart rate 3 or less days per week.   He is taking medications regularly.                 Review of Systems   Constitutional:  " "Positive for fatigue. Negative for chills and fever.   HENT:  Positive for congestion and rhinorrhea.    Respiratory:  Negative for cough.             Objective    /72 (BP Location: Right arm, Patient Position: Sitting, Cuff Size: Adult Regular)   Pulse 72   Temp 97.1  F (36.2  C) (Tympanic)   Resp 20   Ht 1.759 m (5' 9.25\")   Wt 84.8 kg (187 lb)   SpO2 97%   BMI 27.42 kg/m    Body mass index is 27.42 kg/m .  Physical Exam attentive no acute distress  Ears canals and drums normal  Conjunctiva pink  Nasal mucosa is mildly inflamed there is slight purulence  Oropharynx benign  Neck supple no adenopathy  Lungs clear ventilated  Cardiovascular regular rate and rhythm                        Answers submitted by the patient for this visit:  Patient Health Questionnaire (Submitted on 12/19/2023)  If you checked off any problems, how difficult have these problems made it for you to do your work, take care of things at home, or get along with other people?: Not difficult at all  PHQ9 TOTAL SCORE: 1    "

## 2023-12-19 NOTE — LETTER
December 19, 2023      Marc Oneal   [address] [address]        Dear ,    We are writing to inform you of your test results.    Your creatinine remains mildly elevated but is stable.  We should repeat that in 1 year.    Resulted Orders   Basic metabolic panel   Result Value Ref Range    Sodium 141 135 - 145 mmol/L      Comment:      Reference intervals for this test were updated on 09/26/2023 to more accurately reflect our healthy population. There may be differences in the flagging of prior results with similar values performed with this method. Interpretation of those prior results can be made in the context of the updated reference intervals.     Potassium 3.9 3.4 - 5.3 mmol/L    Chloride 104 98 - 107 mmol/L    Carbon Dioxide (CO2) 28 22 - 29 mmol/L    Anion Gap 9 7 - 15 mmol/L    Urea Nitrogen 17.2 6.0 - 20.0 mg/dL    Creatinine 1.39 (H) 0.67 - 1.17 mg/dL    GFR Estimate 59 (L) >60 mL/min/1.73m2    Calcium 9.7 8.6 - 10.0 mg/dL    Glucose 98 70 - 99 mg/dL       If you have any questions or concerns, please call the clinic at the number listed above.       Sincerely,      JASMEET Dozier

## 2024-07-17 ENCOUNTER — VIRTUAL VISIT (OUTPATIENT)
Dept: FAMILY MEDICINE | Facility: CLINIC | Age: 59
End: 2024-07-17
Payer: COMMERCIAL

## 2024-07-17 DIAGNOSIS — F33.42 MAJOR DEPRESSIVE DISORDER, RECURRENT EPISODE, IN FULL REMISSION (H): ICD-10-CM

## 2024-07-17 PROCEDURE — 99441 PR PHYSICIAN TELEPHONE EVALUATION 5-10 MIN: CPT | Mod: 93 | Performed by: FAMILY MEDICINE

## 2024-07-17 RX ORDER — PAROXETINE 20 MG/1
20 TABLET, FILM COATED ORAL DAILY
Qty: 90 TABLET | Refills: 4 | Status: SHIPPED | OUTPATIENT
Start: 2024-07-17

## 2024-07-17 ASSESSMENT — PATIENT HEALTH QUESTIONNAIRE - PHQ9: SUM OF ALL RESPONSES TO PHQ QUESTIONS 1-9: 3

## 2024-07-17 NOTE — PROGRESS NOTES
"Marc is a 58 year old who is being evaluated via a billable telephone visit.    What phone number would you like to be contacted at? [phone]  How would you like to obtain your AVS? Nata  Originating Location (pt. Location): Home    Distant Location (provider location):  On-site    Assessment & Plan     Major depressive disorder, recurrent episode, in full remission (H24)  Stable on current regimen, refilled medication for a year.  Will follow-up for annual preventive exam.  - PARoxetine (PAXIL) 20 MG tablet; Take 1 tablet (20 mg) by mouth daily          BMI  Estimated body mass index is 27.42 kg/m  as calculated from the following:    Height as of 12/19/23: 1.759 m (5' 9.25\").    Weight as of 12/19/23: 84.8 kg (187 lb).             Subjective   Marc is a 58 year old, presenting for the following health issues:  Recheck Medication (Patient states no questions/concerns. )        7/17/2024     9:57 AM   Additional Questions   Roomed by Bianca SARKAR CMA   Accompanied by None         Depression   How are you doing with your depression since your last visit? Improved things are going great  Are you having other symptoms that might be associated with depression? No  Have you had a significant life event?  No   Are you feeling anxious or having panic attacks?   No  Do you have any concerns with your use of alcohol or other drugs? No    Social History     Tobacco Use    Smoking status: Never     Passive exposure: Never    Smokeless tobacco: Never   Vaping Use    Vaping status: Never Used         6/7/2023    12:31 PM 12/19/2023     7:24 AM 7/17/2024     9:58 AM   PHQ   PHQ-9 Total Score 3 1 3   Q9: Thoughts of better off dead/self-harm past 2 weeks Not at all Not at all Not at all         7/17/2024     9:58 AM   Last PHQ-9   1.  Little interest or pleasure in doing things 1   2.  Feeling down, depressed, or hopeless 0   3.  Trouble falling or staying asleep, or sleeping too much 0   4.  Feeling tired or having little " energy 1   5.  Poor appetite or overeating 0   6.  Feeling bad about yourself 0   7.  Trouble concentrating 1   8.  Moving slowly or restless 0   Q9: Thoughts of better off dead/self-harm past 2 weeks 0   PHQ-9 Total Score 3   Difficulty at work, home, or with people Not difficult at all       Suicide Assessment Five-step Evaluation and Treatment (SAFE-T)                Objective           Vitals:  No vitals were obtained today due to virtual visit.    Physical Exam   General: Alert and no distress //Respiratory: No audible wheeze, cough, or shortness of breath // Psychiatric:  Appropriate affect, tone, and pace of words            Phone call duration: 5 minutes  Signed Electronically by: Juli Galvan MD

## 2024-07-27 ENCOUNTER — HEALTH MAINTENANCE LETTER (OUTPATIENT)
Age: 59
End: 2024-07-27

## 2024-12-01 SDOH — HEALTH STABILITY: PHYSICAL HEALTH: ON AVERAGE, HOW MANY DAYS PER WEEK DO YOU ENGAGE IN MODERATE TO STRENUOUS EXERCISE (LIKE A BRISK WALK)?: 3 DAYS

## 2024-12-01 SDOH — HEALTH STABILITY: PHYSICAL HEALTH: ON AVERAGE, HOW MANY MINUTES DO YOU ENGAGE IN EXERCISE AT THIS LEVEL?: 30 MIN

## 2024-12-01 ASSESSMENT — SOCIAL DETERMINANTS OF HEALTH (SDOH): HOW OFTEN DO YOU GET TOGETHER WITH FRIENDS OR RELATIVES?: TWICE A WEEK

## 2024-12-02 ENCOUNTER — OFFICE VISIT (OUTPATIENT)
Dept: FAMILY MEDICINE | Facility: CLINIC | Age: 59
End: 2024-12-02
Payer: COMMERCIAL

## 2024-12-02 VITALS
RESPIRATION RATE: 12 BRPM | HEART RATE: 69 BPM | HEIGHT: 70 IN | BODY MASS INDEX: 26.97 KG/M2 | DIASTOLIC BLOOD PRESSURE: 68 MMHG | SYSTOLIC BLOOD PRESSURE: 112 MMHG | OXYGEN SATURATION: 98 % | TEMPERATURE: 97.9 F | WEIGHT: 188.4 LBS

## 2024-12-02 DIAGNOSIS — Z00.00 ROUTINE GENERAL MEDICAL EXAMINATION AT A HEALTH CARE FACILITY: Primary | ICD-10-CM

## 2024-12-02 DIAGNOSIS — Z13.6 SCREENING FOR CARDIOVASCULAR CONDITION: ICD-10-CM

## 2024-12-02 DIAGNOSIS — Z13.1 SCREENING FOR DIABETES MELLITUS: ICD-10-CM

## 2024-12-02 DIAGNOSIS — Z12.5 SCREENING FOR PROSTATE CANCER: ICD-10-CM

## 2024-12-02 DIAGNOSIS — N18.31 STAGE 3A CHRONIC KIDNEY DISEASE (H): ICD-10-CM

## 2024-12-02 LAB
ALBUMIN SERPL BCG-MCNC: 4.6 G/DL (ref 3.5–5.2)
ALP SERPL-CCNC: 106 U/L (ref 40–150)
ALT SERPL W P-5'-P-CCNC: 33 U/L (ref 0–70)
ANION GAP SERPL CALCULATED.3IONS-SCNC: 11 MMOL/L (ref 7–15)
AST SERPL W P-5'-P-CCNC: 34 U/L (ref 0–45)
BILIRUB SERPL-MCNC: 0.7 MG/DL
BUN SERPL-MCNC: 21.7 MG/DL (ref 8–23)
CALCIUM SERPL-MCNC: 9.9 MG/DL (ref 8.8–10.4)
CHLORIDE SERPL-SCNC: 101 MMOL/L (ref 98–107)
CHOLEST SERPL-MCNC: 252 MG/DL
CREAT SERPL-MCNC: 1.48 MG/DL (ref 0.67–1.17)
CREAT UR-MCNC: 202 MG/DL
EGFRCR SERPLBLD CKD-EPI 2021: 54 ML/MIN/1.73M2
FASTING STATUS PATIENT QL REPORTED: YES
FASTING STATUS PATIENT QL REPORTED: YES
GLUCOSE SERPL-MCNC: 96 MG/DL (ref 70–99)
HCO3 SERPL-SCNC: 26 MMOL/L (ref 22–29)
HDLC SERPL-MCNC: 58 MG/DL
HGB BLD-MCNC: 15.9 G/DL (ref 13.3–17.7)
LDLC SERPL CALC-MCNC: 179 MG/DL
MICROALBUMIN UR-MCNC: <12 MG/L
MICROALBUMIN/CREAT UR: NORMAL MG/G{CREAT}
NONHDLC SERPL-MCNC: 194 MG/DL
POTASSIUM SERPL-SCNC: 4.5 MMOL/L (ref 3.4–5.3)
PROT SERPL-MCNC: 7.5 G/DL (ref 6.4–8.3)
PSA SERPL DL<=0.01 NG/ML-MCNC: 2.47 NG/ML (ref 0–3.5)
SODIUM SERPL-SCNC: 138 MMOL/L (ref 135–145)
TRIGL SERPL-MCNC: 75 MG/DL

## 2024-12-02 PROCEDURE — 82043 UR ALBUMIN QUANTITATIVE: CPT | Performed by: FAMILY MEDICINE

## 2024-12-02 PROCEDURE — 99396 PREV VISIT EST AGE 40-64: CPT | Performed by: FAMILY MEDICINE

## 2024-12-02 PROCEDURE — 80061 LIPID PANEL: CPT | Performed by: FAMILY MEDICINE

## 2024-12-02 PROCEDURE — 36415 COLL VENOUS BLD VENIPUNCTURE: CPT | Performed by: FAMILY MEDICINE

## 2024-12-02 PROCEDURE — 85018 HEMOGLOBIN: CPT | Mod: QW | Performed by: FAMILY MEDICINE

## 2024-12-02 PROCEDURE — 80053 COMPREHEN METABOLIC PANEL: CPT | Performed by: FAMILY MEDICINE

## 2024-12-02 PROCEDURE — 82570 ASSAY OF URINE CREATININE: CPT | Performed by: FAMILY MEDICINE

## 2024-12-02 PROCEDURE — G0103 PSA SCREENING: HCPCS | Performed by: FAMILY MEDICINE

## 2024-12-02 NOTE — PROGRESS NOTES
"Preventive Care Visit  [facility]  Juli Galvan MD, Family Medicine  Dec 2, 2024      Assessment & Plan     Routine general medical examination at a health care facility  Health maintenance updated, preventive services reviewed, vaccines discussed, questions are answered, patient encouraged to continue annual wellness visits to review preventive services    Stage 3a chronic kidney disease (H)  Recheck labs  - Albumin Random Urine Quantitative with Creat Ratio; Future  - Hemoglobin; Future  - Comprehensive metabolic panel; Future  - Comprehensive metabolic panel  - Hemoglobin  - Albumin Random Urine Quantitative with Creat Ratio    Screening for cardiovascular condition  Labs today  - Lipid panel reflex to direct LDL Fasting; Future  - Lipid panel reflex to direct LDL Fasting    Screening for diabetes mellitus  Labs today  - Comprehensive metabolic panel; Future  - Comprehensive metabolic panel    Screening for prostate cancer  Labs today  - PSA, screen; Future  - PSA, screen            BMI  Estimated body mass index is 27.42 kg/m  as calculated from the following:    Height as of this encounter: 1.765 m (5' 9.5\").    Weight as of this encounter: 85.5 kg (188 lb 6.4 oz).       Counseling  Appropriate preventive services were addressed with this patient via screening, questionnaire, or discussion as appropriate for fall prevention, nutrition, physical activity, Tobacco-use cessation, social engagement, weight loss and cognition.  Checklist reviewing preventive services available has been given to the patient.  Reviewed patient's diet, addressing concerns and/or questions.   He is at risk for lack of exercise and has been provided with information to increase physical activity for the benefit of his well-being.           Wing Tran is a 59 year old, presenting for the following:  Physical        12/2/2024     7:08 AM   Additional Questions   Roomed by Bianca SARKAR CMA   Accompanied " by None        Here for checkup.  Has been in good health.  Due to repeat labs given elevated creatinine previously.  Otherwise no concerns.  Preventative cares reviewed      Health Care Directive  Patient does not have a Health Care Directive: Discussed advance care planning with patient; however, patient declined at this time.      12/1/2024   General Health   How would you rate your overall physical health? Good   Feel stress (tense, anxious, or unable to sleep) Only a little      (!) STRESS CONCERN      12/1/2024   Nutrition   Three or more servings of calcium each day? Yes   Diet: Regular (no restrictions)   How many servings of fruit and vegetables per day? (!) 2-3   How many sweetened beverages each day? (!) 2            12/1/2024   Exercise   Days per week of moderate/strenous exercise 3 days   Average minutes spent exercising at this level 30 min            12/1/2024   Social Factors   Frequency of gathering with friends or relatives Twice a week   Worry food won't last until get money to buy more No   Food not last or not have enough money for food? No   Do you have housing? (Housing is defined as stable permanent housing and does not include staying ouside in a car, in a tent, in an abandoned building, in an overnight shelter, or couch-surfing.) Yes   Are you worried about losing your housing? No   Lack of transportation? No   Unable to get utilities (heat,electricity)? No            12/1/2024   Fall Risk   Fallen 2 or more times in the past year? No    Trouble with walking or balance? No        Patient-reported          12/1/2024   Dental   Dentist two times every year? Yes            12/1/2024   TB Screening   Were you born outside of the [place]? No            Today's PHQ-9 Score:       7/17/2024     9:58 AM   PHQ-9 SCORE   PHQ-9 Total Score 3           12/1/2024   Substance Use   Alcohol more than 3/day or more than 7/wk No   Do you use any other substances recreationally? No        Social History  "    Tobacco Use    Smoking status: Never     Passive exposure: Never    Smokeless tobacco: Never   Vaping Use    Vaping status: Never Used   Substance Use Topics    Alcohol use: Yes    Drug use: Never           12/1/2024   STI Screening   New sexual partner(s) since last STI/HIV test? No      Last PSA:   Prostate Specific Antigen Screen   Date Value Ref Range Status   06/13/2023 1.73 0.00 - 3.50 ng/mL Final   01/20/2022 1.30 < OR = 4.00 ng/mL Final     Comment:     The total PSA value from this assay system is   standardized against the WHO standard. The test   result will be approximately 20% lower when compared   to the equimolar-standardized total PSA (Ariana   Madhu). Comparison of serial PSA results should be   interpreted with this fact in mind.     This test was performed using the Siemens   chemiluminescent method. Values obtained from   different assay methods cannot be used  interchangeably. PSA levels, regardless of  value, should not be interpreted as absolute  evidence of the presence or absence of disease.  Lab test performed by:  [facility]:  [facility]  [address] [place] [address]  Darrel Diamond M.D.  [facility] Specimen received date and time: 21-JAN-2022 02:30:00.00       ASCVD Risk   The 10-year ASCVD risk score (More DK, et al., 2019) is: 6.2%    Values used to calculate the score:      Age: 59 years      Sex: Male      Is Non- : No      Diabetic: No      Tobacco smoker: No      Systolic Blood Pressure: 112 mmHg      Is BP treated: No      HDL Cholesterol: 59 mg/dL      Total Cholesterol: 223 mg/dL           Reviewed and updated as needed this visit by Provider   Tobacco  Allergies  Meds  Problems  Med Hx  Surg Hx  Fam Hx                     Objective    Exam  /68   Pulse 69   Temp 97.9  F (36.6  C)   Resp 12   Ht 1.765 m (5' 9.5\")   Wt 85.5 kg (188 lb 6.4 oz)   SpO2 98%   BMI 27.42 kg/m     Estimated " "body mass index is 27.42 kg/m  as calculated from the following:    Height as of this encounter: 1.765 m (5' 9.5\").    Weight as of this encounter: 85.5 kg (188 lb 6.4 oz).    Physical Exam  GENERAL: alert and no distress  EYES: Eyes grossly normal to inspection, PERRL and conjunctivae and sclerae normal  HENT: ear canals and TM's normal, nose and mouth without ulcers or lesions  NECK: no adenopathy, no asymmetry, masses, or scars  RESP: lungs clear to auscultation - no rales, rhonchi or wheezes  CV: regular rate and rhythm, normal S1 S2, no S3 or S4, no murmur, click or rub, no peripheral edema  MS: no gross musculoskeletal defects noted, no edema  SKIN: no suspicious lesions or rashes  NEURO: Normal strength and tone, mentation intact and speech normal  PSYCH: mentation appears normal, affect normal/bright        Signed Electronically by: Juli Galvan MD    "

## 2024-12-02 NOTE — PATIENT INSTRUCTIONS
Patient Education   Preventive Care Advice   This is general advice given by our system to help you stay healthy. However, your care team may have specific advice just for you. Please talk to your care team about your preventive care needs.  Nutrition  Eat 5 or more servings of fruits and vegetables each day.  Try wheat bread, brown rice and whole grain pasta (instead of white bread, rice, and pasta).  Get enough calcium and vitamin D. Check the label on foods and aim for 100% of the RDA (recommended daily allowance).  Lifestyle  Exercise at least 150 minutes each week  (30 minutes a day, 5 days a week).  Do muscle strengthening activities 2 days a week. These help control your weight and prevent disease.  No smoking.  Wear sunscreen to prevent skin cancer.  Have a dental exam and cleaning every 6 months.  Yearly exams  See your health care team every year to talk about:  Any changes in your health.  Any medicines your care team has prescribed.  Preventive care, family planning, and ways to prevent chronic diseases.  Shots (vaccines)   HPV shots (up to age 26), if you've never had them before.  Hepatitis B shots (up to age 59), if you've never had them before.  COVID-19 shot: Get this shot when it's due.  Flu shot: Get a flu shot every year.  Tetanus shot: Get a tetanus shot every 10 years.  Pneumococcal, hepatitis A, and RSV shots: Ask your care team if you need these based on your risk.  Shingles shot (for age 50 and up)  General health tests  Diabetes screening:  Starting at age 35, Get screened for diabetes at least every 3 years.  If you are younger than age 35, ask your care team if you should be screened for diabetes.  Cholesterol test: At age 39, start having a cholesterol test every 5 years, or more often if advised.  Bone density scan (DEXA): At age 50, ask your care team if you should have this scan for osteoporosis (brittle bones).  Hepatitis C: Get tested at least once in your life.  STIs (sexually  transmitted infections)  Before age 24: Ask your care team if you should be screened for STIs.  After age 24: Get screened for STIs if you're at risk. You are at risk for STIs (including HIV) if:  You are sexually active with more than one person.  You don't use condoms every time.  You or a partner was diagnosed with a sexually transmitted infection.  If you are at risk for HIV, ask about PrEP medicine to prevent HIV.  Get tested for HIV at least once in your life, whether you are at risk for HIV or not.  Cancer screening tests  Cervical cancer screening: If you have a cervix, begin getting regular cervical cancer screening tests starting at age 21.  Breast cancer scan (mammogram): If you've ever had breasts, begin having regular mammograms starting at age 40. This is a scan to check for breast cancer.  Colon cancer screening: It is important to start screening for colon cancer at age 45.  Have a colonoscopy test every 10 years (or more often if you're at risk) Or, ask your provider about stool tests like a FIT test every year or Cologuard test every 3 years.  To learn more about your testing options, visit:   .  For help making a decision, visit:   https://bit.ly/pw30142.  Prostate cancer screening test: If you have a prostate, ask your care team if a prostate cancer screening test (PSA) at age 55 is right for you.  Lung cancer screening: If you are a current or former smoker ages 50 to 80, ask your care team if ongoing lung cancer screenings are right for you.  For informational purposes only. Not to replace the advice of your health care provider. Copyright   2023 [employer]. All rights reserved. Clinically reviewed by the [facility] Transitions Program. [employer] [id number] - REV 01/24.